# Patient Record
Sex: FEMALE | Race: BLACK OR AFRICAN AMERICAN | Employment: UNEMPLOYED | URBAN - METROPOLITAN AREA
[De-identification: names, ages, dates, MRNs, and addresses within clinical notes are randomized per-mention and may not be internally consistent; named-entity substitution may affect disease eponyms.]

---

## 2017-09-14 ENCOUNTER — APPOINTMENT (OUTPATIENT)
Dept: GENERAL RADIOLOGY | Age: 42
End: 2017-09-14
Attending: EMERGENCY MEDICINE
Payer: SELF-PAY

## 2017-09-14 ENCOUNTER — HOSPITAL ENCOUNTER (EMERGENCY)
Age: 42
Discharge: HOME | End: 2017-09-14
Attending: EMERGENCY MEDICINE
Payer: SELF-PAY

## 2017-09-14 VITALS
RESPIRATION RATE: 18 BRPM | TEMPERATURE: 98.3 F | OXYGEN SATURATION: 100 % | WEIGHT: 242.5 LBS | HEIGHT: 64 IN | HEART RATE: 69 BPM | DIASTOLIC BLOOD PRESSURE: 60 MMHG | SYSTOLIC BLOOD PRESSURE: 101 MMHG | BODY MASS INDEX: 41.4 KG/M2

## 2017-09-14 DIAGNOSIS — R07.9 ACUTE CHEST PAIN: Primary | ICD-10-CM

## 2017-09-14 LAB
ALBUMIN SERPL-MCNC: 2.9 G/DL (ref 3.5–5)
ALBUMIN/GLOB SERPL: 0.6 {RATIO} (ref 1.1–2.2)
ALP SERPL-CCNC: 66 U/L (ref 45–117)
ALT SERPL-CCNC: 35 U/L (ref 12–78)
ANION GAP SERPL CALC-SCNC: 7 MMOL/L (ref 5–15)
AST SERPL-CCNC: 30 U/L (ref 15–37)
BILIRUB SERPL-MCNC: 0.1 MG/DL (ref 0.2–1)
BUN SERPL-MCNC: 14 MG/DL (ref 6–20)
BUN/CREAT SERPL: 17 (ref 12–20)
CALCIUM SERPL-MCNC: 8.8 MG/DL (ref 8.5–10.1)
CHLORIDE SERPL-SCNC: 105 MMOL/L (ref 97–108)
CK MB CFR SERPL CALC: 0.4 % (ref 0–2.5)
CK MB SERPL-MCNC: 1 NG/ML (ref 5–25)
CK SERPL-CCNC: 249 U/L (ref 26–192)
CO2 SERPL-SCNC: 27 MMOL/L (ref 21–32)
CREAT SERPL-MCNC: 0.81 MG/DL (ref 0.55–1.02)
GLOBULIN SER CALC-MCNC: 4.6 G/DL (ref 2–4)
GLUCOSE SERPL-MCNC: 97 MG/DL (ref 65–100)
POTASSIUM SERPL-SCNC: 3.4 MMOL/L (ref 3.5–5.1)
PROT SERPL-MCNC: 7.5 G/DL (ref 6.4–8.2)
SODIUM SERPL-SCNC: 139 MMOL/L (ref 136–145)
TROPONIN I SERPL-MCNC: <0.04 NG/ML

## 2017-09-14 PROCEDURE — 71010 XR CHEST PORT: CPT

## 2017-09-14 PROCEDURE — 82550 ASSAY OF CK (CPK): CPT | Performed by: EMERGENCY MEDICINE

## 2017-09-14 PROCEDURE — 84484 ASSAY OF TROPONIN QUANT: CPT | Performed by: EMERGENCY MEDICINE

## 2017-09-14 PROCEDURE — 99285 EMERGENCY DEPT VISIT HI MDM: CPT

## 2017-09-14 PROCEDURE — 36415 COLL VENOUS BLD VENIPUNCTURE: CPT | Performed by: EMERGENCY MEDICINE

## 2017-09-14 PROCEDURE — 80053 COMPREHEN METABOLIC PANEL: CPT | Performed by: EMERGENCY MEDICINE

## 2017-09-14 PROCEDURE — 93005 ELECTROCARDIOGRAM TRACING: CPT

## 2017-09-14 RX ORDER — SODIUM CHLORIDE 0.9 % (FLUSH) 0.9 %
5-10 SYRINGE (ML) INJECTION EVERY 8 HOURS
Status: DISCONTINUED | OUTPATIENT
Start: 2017-09-14 | End: 2017-09-14 | Stop reason: HOSPADM

## 2017-09-14 RX ORDER — SODIUM CHLORIDE 0.9 % (FLUSH) 0.9 %
5-10 SYRINGE (ML) INJECTION AS NEEDED
Status: DISCONTINUED | OUTPATIENT
Start: 2017-09-14 | End: 2017-09-14 | Stop reason: HOSPADM

## 2017-09-14 NOTE — ED NOTES
Pt c/o non radiating mid chest pain started x 30 min ago,sts\" My new med give me chest pain. \"  Emergency Department Nursing Plan of Care       The Nursing Plan of Care is developed from the Nursing assessment and Emergency Department Attending provider initial evaluation. The plan of care may be reviewed in the ED Provider note.     The Plan of Care was developed with the following considerations:   Patient / Family readiness to learn indicated by:verbalized understanding  Persons(s) to be included in education: patient  Barriers to Learning/Limitations:No    Signed     Kylah Adams RN    9/14/2017   12:50 PM

## 2017-09-14 NOTE — ED PROVIDER NOTES
HPI Comments: Iggy Saleh is a 39 y.o. female with PMHx of dentalgia who presents via EMS to the ED c/o sharp non-radiating mid-sternal CP x today. Per EMS, pt has been taking Methadone for 2 days now. Pt specifically denies cough, constipation, fever, chills, nausea, vomiting, diarrhea, or SOB. Social hx: - EtOH use, - Illicit drug use    PCP: None    There are no other complaints, changes or physical findings at this time. The history is provided by the patient and the EMS personnel. No  was used. No past medical history on file. Past Surgical History:   Procedure Laterality Date    HX ORTHOPAEDIC      R knee         No family history on file. Social History     Social History    Marital status: SINGLE     Spouse name: N/A    Number of children: N/A    Years of education: N/A     Occupational History    Not on file. Social History Main Topics    Smoking status: Never Smoker    Smokeless tobacco: Not on file    Alcohol use No    Drug use: No    Sexual activity: Not on file     Other Topics Concern    Not on file     Social History Narrative    No narrative on file         ALLERGIES: Zofran [ondansetron hcl (pf)]; Amoxicillin; Bactrim [sulfamethoprim ds]; Darvocet a500 [propoxyphene n-acetaminophen]; Doxycycline; Penicillin v; and Ultram [tramadol]    Review of Systems   Constitutional: Negative for chills and fever. HENT: Negative for congestion, rhinorrhea, sneezing and sore throat. Eyes: Negative for redness and visual disturbance. Respiratory: Negative for cough and shortness of breath. Cardiovascular: Positive for chest pain. Negative for leg swelling. Gastrointestinal: Negative for abdominal pain, constipation, nausea and vomiting. Genitourinary: Negative for difficulty urinating and frequency. Musculoskeletal: Negative for back pain, myalgias and neck stiffness. Skin: Negative for rash.    Neurological: Negative for dizziness, syncope, weakness and headaches. Hematological: Negative for adenopathy. Vitals:    09/14/17 1242 09/14/17 1352   BP: 103/61 101/60   Pulse: 72 69   Resp: 18    Temp: 98.3 °F (36.8 °C)    SpO2: 100%    Weight: 110 kg (242 lb 8 oz)    Height: 5' 4\" (1.626 m)             Physical Exam   Constitutional: She is oriented to person, place, and time. She appears well-developed and well-nourished. Obese   HENT:   Head: Normocephalic and atraumatic. Mouth/Throat: Oropharynx is clear and moist.   Eyes: Conjunctivae and EOM are normal.   Neck: Normal range of motion and full passive range of motion without pain. Neck supple. Cardiovascular: Normal rate, regular rhythm, S1 normal, S2 normal, normal heart sounds, intact distal pulses and normal pulses. No murmur heard. Pulmonary/Chest: Effort normal and breath sounds normal. No respiratory distress. She has no wheezes. Abdominal: Soft. Normal appearance and bowel sounds are normal. She exhibits no distension. There is no tenderness. There is no rebound. Musculoskeletal: Normal range of motion. She exhibits no tenderness. Non-pitting edema. Neurological: She is alert and oriented to person, place, and time. She has normal strength. Lethargic/somnolent   Skin: Skin is warm, dry and intact. No rash noted. Psychiatric: She has a normal mood and affect. Her speech is normal and behavior is normal. Judgment and thought content normal.   Nursing note and vitals reviewed.        MDM  Number of Diagnoses or Management Options     Amount and/or Complexity of Data Reviewed  Clinical lab tests: ordered and reviewed  Tests in the radiology section of CPT®: reviewed and ordered  Obtain history from someone other than the patient: yes (EMS)  Review and summarize past medical records: yes  Independent visualization of images, tracings, or specimens: yes    Patient Progress  Patient progress: stable    ED Course       Procedures    Progress Note:  2:46 PM  Pt actively resistant to having blood drawn for ABG testing, putting personnel at risk with her behavior. Written by Nay Morrison ED Scribe, as dictated by Gonzales Wells MD.    Progress Note:  3:08 PM  Pt declines any further workup. She states that she wants to be discharged at this time, would like to go home and eat. Written by TEAGAN Knappibnikia, as dictated by Gonzales Wells MD.    Progress Note:  3:28 PM  Pt was ambulated in ED without difficulty. Written by Nay Morrison ED Scribe, as dictated by Gonzales Wells MD.    LABORATORY TESTS:  Recent Results (from the past 12 hour(s))   METABOLIC PANEL, COMPREHENSIVE    Collection Time: 09/14/17  2:30 PM   Result Value Ref Range    Sodium 139 136 - 145 mmol/L    Potassium 3.4 (L) 3.5 - 5.1 mmol/L    Chloride 105 97 - 108 mmol/L    CO2 27 21 - 32 mmol/L    Anion gap 7 5 - 15 mmol/L    Glucose 97 65 - 100 mg/dL    BUN 14 6 - 20 MG/DL    Creatinine 0.81 0.55 - 1.02 MG/DL    BUN/Creatinine ratio 17 12 - 20      GFR est AA >60 >60 ml/min/1.73m2    GFR est non-AA >60 >60 ml/min/1.73m2    Calcium 8.8 8.5 - 10.1 MG/DL    Bilirubin, total 0.1 (L) 0.2 - 1.0 MG/DL    ALT (SGPT) 35 12 - 78 U/L    AST (SGOT) 30 15 - 37 U/L    Alk. phosphatase 66 45 - 117 U/L    Protein, total 7.5 6.4 - 8.2 g/dL    Albumin 2.9 (L) 3.5 - 5.0 g/dL    Globulin 4.6 (H) 2.0 - 4.0 g/dL    A-G Ratio 0.6 (L) 1.1 - 2.2     CK W/ CKMB & INDEX    Collection Time: 09/14/17  2:30 PM   Result Value Ref Range     (H) 26 - 192 U/L    CK - MB 1.0 <3.6 NG/ML    CK-MB Index 0.4 0 - 2.5     TROPONIN I    Collection Time: 09/14/17  2:30 PM   Result Value Ref Range    Troponin-I, Qt. <0.04 <0.05 ng/mL       IMAGING RESULTS:  CXR Results  (Last 48 hours)               09/14/17 1345  XR CHEST PORT Final result    Impression:  IMPRESSION:        No acute process. Narrative:  EXAM:  XR CHEST PORT       INDICATION:  chest pain       COMPARISON:  None.        FINDINGS:       A portable AP radiograph of the chest was obtained at 13:40 hours. The patient   is on a cardiac monitor. The lungs are clear. The cardiac and mediastinal   contours and pulmonary vascularity are normal.  The bones and soft tissues are   unremarkable. MEDICATIONS GIVEN:  Medications   sodium chloride (NS) flush 5-10 mL (not administered)   sodium chloride (NS) flush 5-10 mL (not administered)       IMPRESSION:  1. Acute chest pain        PLAN:  1. Current Discharge Medication List        2. Follow-up Information     Follow up With Details Comments [id number]   [address]  [phone]        Return to ED if worse     DISCHARGE NOTE  3:37 PM  The patient has been re-evaluated and is ready for discharge. Reviewed available results with patient. Counseled pt on diagnosis and care plan. Pt has expressed understanding, and all questions have been answered. Pt agrees with plan and agrees to F/U as recommended, or return to the ED if their sxs worsen. Discharge instructions have been provided and explained to the pt, along with reasons to return to the ED. This note is prepared by Raji Ratliff, acting as Scribe for Shady Mcdowell MD.    Shady Mcdowell MD: The scribe's documentation has been prepared under my direction and personally reviewed by me in its entirety. I confirm that the note above accurately reflects all work, treatment, procedures, and medical decision making performed by me.

## 2017-09-14 NOTE — ED NOTES
Pt walk in the hallway with steady gait,O2 sat 94% and HR 58,pt sts\"feels better\" no s/s of distress while walking,provider made aware.

## 2017-09-14 NOTE — DISCHARGE INSTRUCTIONS
Chest Pain: Care Instructions  Your Care Instructions  There are many things that can cause chest pain. Some are not serious and will get better on their own in a few days. But some kinds of chest pain need more testing and treatment. Your doctor may have recommended a follow-up visit in the next 8 to 12 hours. If you are not getting better, you may need more tests or treatment. Even though your doctor has released you, you still need to watch for any problems. The doctor carefully checked you, but sometimes problems can develop later. If you have new symptoms or if your symptoms do not get better, get medical care right away. If you have worse or different chest pain or pressure that lasts more than 5 minutes or you passed out (lost consciousness), call 911 or seek other emergency help right away. A medical visit is only one step in your treatment. Even if you feel better, you still need to do what your doctor recommends, such as going to all suggested follow-up appointments and taking medicines exactly as directed. This will help you recover and help prevent future problems. How can you care for yourself at home? · Rest until you feel better. · Take your medicine exactly as prescribed. Call your doctor if you think you are having a problem with your medicine. · Do not drive after taking a prescription pain medicine. When should you call for help? Call 911 if:  · You passed out (lost consciousness). · You have severe difficulty breathing. · You have symptoms of a heart attack. These may include:  ¨ Chest pain or pressure, or a strange feeling in your chest.  ¨ Sweating. ¨ Shortness of breath. ¨ Nausea or vomiting. ¨ Pain, pressure, or a strange feeling in your back, neck, jaw, or upper belly or in one or both shoulders or arms. ¨ Lightheadedness or sudden weakness. ¨ A fast or irregular heartbeat.   After you call 911, the  may tell you to chew 1 adult-strength or 2 to 4 low-dose aspirin. Wait for an ambulance. Do not try to drive yourself. Call your doctor today if:  · You have any trouble breathing. · Your chest pain gets worse. · You are dizzy or lightheaded, or you feel like you may faint. · You are not getting better as expected. · You are having new or different chest pain. Where can you learn more? Go to http://hugo-denise.info/. Enter A120 in the search box to learn more about \"Chest Pain: Care Instructions. \"  Current as of: March 20, 2017  Content Version: 11.3  © 3732-8681 [employer]. Care instructions adapted under license by [employer] (which disclaims liability or warranty for this information). If you have questions about a medical condition or this instruction, always ask your healthcare professional. [address] any warranty or liability for your use of this information.

## 2017-09-14 NOTE — ED TRIAGE NOTES
Pt arrived via EMS with c/onon radiating mid chest pain started x 30 min ago. As per EMS report pt started methadone and today her day 2 of this med. Denies n/v/d,fever,chills,sob.

## 2017-09-14 NOTE — ED NOTES
Pt sts\"arterial stick hurts so bad,you can draw lab with other way. \"so attempted to draw blood with straight stick but as soon as she saw blood going in the tube she pulled her arm and said this is enough you don't need to fill whole tube and want to take out needle, she stuck multiple times but she did same each time,so unable to send blood in the lab as its not enough to run test,provider Dr Fuad Lei made aware.

## 2017-09-19 LAB
ATRIAL RATE: 66 BPM
CALCULATED P AXIS, ECG09: 41 DEGREES
CALCULATED R AXIS, ECG10: 35 DEGREES
CALCULATED T AXIS, ECG11: 43 DEGREES
DIAGNOSIS, 93000: NORMAL
P-R INTERVAL, ECG05: 192 MS
Q-T INTERVAL, ECG07: 406 MS
QRS DURATION, ECG06: 78 MS
QTC CALCULATION (BEZET), ECG08: 425 MS
VENTRICULAR RATE, ECG03: 66 BPM

## 2021-07-07 ENCOUNTER — HOSPITAL ENCOUNTER (EMERGENCY)
Age: 46
Discharge: HOME | End: 2021-07-08
Attending: EMERGENCY MEDICINE
Payer: MEDICAID

## 2021-07-07 DIAGNOSIS — F19.10 SUBSTANCE ABUSE (HCC): Primary | ICD-10-CM

## 2021-07-07 DIAGNOSIS — T40.601A OPIATE OVERDOSE, ACCIDENTAL OR UNINTENTIONAL, INITIAL ENCOUNTER (HCC): ICD-10-CM

## 2021-07-07 LAB
ALBUMIN SERPL-MCNC: 3.5 G/DL (ref 3.5–5)
ALBUMIN/GLOB SERPL: 0.6 {RATIO} (ref 1.1–2.2)
ALP SERPL-CCNC: 75 U/L (ref 45–117)
ALT SERPL-CCNC: 37 U/L (ref 12–78)
AMPHET UR QL SCN: NEGATIVE
ANION GAP SERPL CALC-SCNC: 6 MMOL/L (ref 5–15)
APPEARANCE UR: ABNORMAL
AST SERPL-CCNC: 45 U/L (ref 15–37)
BACTERIA URNS QL MICRO: ABNORMAL /HPF
BARBITURATES UR QL SCN: NEGATIVE
BASOPHILS # BLD: 0 K/UL (ref 0–0.1)
BASOPHILS NFR BLD: 0 % (ref 0–1)
BENZODIAZ UR QL: NEGATIVE
BILIRUB SERPL-MCNC: 0.3 MG/DL (ref 0.2–1)
BILIRUB UR QL CFM: NEGATIVE
BUN SERPL-MCNC: 9 MG/DL (ref 6–20)
BUN/CREAT SERPL: 7 (ref 12–20)
CALCIUM SERPL-MCNC: 9.4 MG/DL (ref 8.5–10.1)
CANNABINOIDS UR QL SCN: NEGATIVE
CHLORIDE SERPL-SCNC: 106 MMOL/L (ref 97–108)
CO2 SERPL-SCNC: 27 MMOL/L (ref 21–32)
COCAINE UR QL SCN: POSITIVE
COLOR UR: ABNORMAL
CREAT SERPL-MCNC: 1.21 MG/DL (ref 0.55–1.02)
DIFFERENTIAL METHOD BLD: ABNORMAL
DRUG SCRN COMMENT,DRGCM: ABNORMAL
EOSINOPHIL # BLD: 0.1 K/UL (ref 0–0.4)
EOSINOPHIL NFR BLD: 1 % (ref 0–7)
EPITH CASTS URNS QL MICRO: ABNORMAL /LPF
ERYTHROCYTE [DISTWIDTH] IN BLOOD BY AUTOMATED COUNT: 13.9 % (ref 11.5–14.5)
ETHANOL SERPL-MCNC: <10 MG/DL
GLOBULIN SER CALC-MCNC: 6.3 G/DL (ref 2–4)
GLUCOSE SERPL-MCNC: 92 MG/DL (ref 65–100)
GLUCOSE UR STRIP.AUTO-MCNC: NEGATIVE MG/DL
HCT VFR BLD AUTO: 37.8 % (ref 35–47)
HGB BLD-MCNC: 11.7 G/DL (ref 11.5–16)
HGB UR QL STRIP: NEGATIVE
IMM GRANULOCYTES # BLD AUTO: 0 K/UL (ref 0–0.04)
IMM GRANULOCYTES NFR BLD AUTO: 0 % (ref 0–0.5)
KETONES UR QL STRIP.AUTO: NEGATIVE MG/DL
LEUKOCYTE ESTERASE UR QL STRIP.AUTO: ABNORMAL
LYMPHOCYTES # BLD: 1.3 K/UL (ref 0.8–3.5)
LYMPHOCYTES NFR BLD: 14 % (ref 12–49)
MCH RBC QN AUTO: 26.8 PG (ref 26–34)
MCHC RBC AUTO-ENTMCNC: 31 G/DL (ref 30–36.5)
MCV RBC AUTO: 86.5 FL (ref 80–99)
METHADONE UR QL: NEGATIVE
MONOCYTES # BLD: 0.6 K/UL (ref 0–1)
MONOCYTES NFR BLD: 6 % (ref 5–13)
NEUTS SEG # BLD: 7.6 K/UL (ref 1.8–8)
NEUTS SEG NFR BLD: 79 % (ref 32–75)
NITRITE UR QL STRIP.AUTO: NEGATIVE
NRBC # BLD: 0 K/UL (ref 0–0.01)
NRBC BLD-RTO: 0 PER 100 WBC
OPIATES UR QL: POSITIVE
PCP UR QL: NEGATIVE
PH UR STRIP: 5.5 [PH] (ref 5–8)
PLATELET # BLD AUTO: 349 K/UL (ref 150–400)
PMV BLD AUTO: 10.1 FL (ref 8.9–12.9)
POTASSIUM SERPL-SCNC: 3.6 MMOL/L (ref 3.5–5.1)
PROT SERPL-MCNC: 9.8 G/DL (ref 6.4–8.2)
PROT UR STRIP-MCNC: 30 MG/DL
RBC # BLD AUTO: 4.37 M/UL (ref 3.8–5.2)
RBC #/AREA URNS HPF: ABNORMAL /HPF (ref 0–5)
SODIUM SERPL-SCNC: 139 MMOL/L (ref 136–145)
SP GR UR REFRACTOMETRY: 1.02 (ref 1–1.03)
UA: UC IF INDICATED,UAUC: ABNORMAL
UROBILINOGEN UR QL STRIP.AUTO: 1 EU/DL (ref 0.2–1)
WBC # BLD AUTO: 9.6 K/UL (ref 3.6–11)
WBC URNS QL MICRO: ABNORMAL /HPF (ref 0–4)

## 2021-07-07 PROCEDURE — 80053 COMPREHEN METABOLIC PANEL: CPT

## 2021-07-07 PROCEDURE — 99285 EMERGENCY DEPT VISIT HI MDM: CPT

## 2021-07-07 PROCEDURE — 85025 COMPLETE CBC W/AUTO DIFF WBC: CPT

## 2021-07-07 PROCEDURE — 80307 DRUG TEST PRSMV CHEM ANLYZR: CPT

## 2021-07-07 PROCEDURE — 81001 URINALYSIS AUTO W/SCOPE: CPT

## 2021-07-07 PROCEDURE — 82077 ASSAY SPEC XCP UR&BREATH IA: CPT

## 2021-07-07 PROCEDURE — 93005 ELECTROCARDIOGRAM TRACING: CPT

## 2021-07-07 PROCEDURE — 36415 COLL VENOUS BLD VENIPUNCTURE: CPT

## 2021-07-08 VITALS
BODY MASS INDEX: 40.2 KG/M2 | TEMPERATURE: 98.1 F | OXYGEN SATURATION: 97 % | SYSTOLIC BLOOD PRESSURE: 142 MMHG | RESPIRATION RATE: 21 BRPM | WEIGHT: 235.45 LBS | HEIGHT: 64 IN | HEART RATE: 81 BPM | DIASTOLIC BLOOD PRESSURE: 92 MMHG

## 2021-07-08 LAB
ATRIAL RATE: 97 BPM
CALCULATED P AXIS, ECG09: 47 DEGREES
CALCULATED R AXIS, ECG10: 41 DEGREES
CALCULATED T AXIS, ECG11: 44 DEGREES
DIAGNOSIS, 93000: NORMAL
P-R INTERVAL, ECG05: 176 MS
Q-T INTERVAL, ECG07: 340 MS
QRS DURATION, ECG06: 72 MS
QTC CALCULATION (BEZET), ECG08: 431 MS
VENTRICULAR RATE, ECG03: 97 BPM

## 2021-07-08 NOTE — ED NOTES
Pt came to the ER via EMS. Pt's family called [phone] for patient  being not herself, and drowsy. As per EMS Pt has hx of drug abuse and her pupils were constricted. Pt denies taking any drugs. Pt is awake and alert. Pt stated that she will wait at the waiting area till her ride comes to get her. I have reviewed discharge instructions with the patient. The patient verbalized understanding.

## 2021-07-08 NOTE — ED PROVIDER NOTES
EMERGENCY DEPARTMENT HISTORY AND PHYSICAL EXAM    Please note that this dictation was completed with [employer], the computer voice recognition software. Quite often unanticipated grammatical, syntax, homophones, and other interpretive errors are inadvertently transcribed by the computer software. Please disregard these errors. Please excuse any errors that have escaped final proofreading. Date: 7/7/2021  Patient Name: Eduardo Quijano  Patient Age and Sex: 39 y.o. female    History of Presenting Illness     Chief Complaint   Patient presents with    Drug Overdose     Pt came via ems for possible overdose. Pt has hx of drug use and family called for Pt acting sedated and having episodes of unresponsiveness. History Provided By: Patient    HPI: Eduardo Quijano, is a 39 y.o. female whose medical history is noted below and includes polysubstance abuse presents to the ED via ambulance after the family found her at home somnolent. They called 911, implants at the scene the patient appeared to be waking up, was very groggy but spontaneously breathing. They noted pinpoint pupils. Narcan or other medications were administered. Glucose was normal.  Patient was transported to us for further evaluation. Pt denies any other alleviating or exacerbating factors. No other associated signs or symptoms. There are no other complaints, changes or physical findings at this time. PCP: None    Past History   All documented elements of the [facility] reviewed and verified by me. -Ruthann Cogan, MD    Past Medical History:  No past medical history on file. Past Surgical History:  Past Surgical History:   Procedure Laterality Date    HX ORTHOPAEDIC      R knee       Family History:  No family history on file. Social History:  Social History     Tobacco Use    Smoking status: Never Smoker   Substance Use Topics    Alcohol use: No    Drug use: No       Allergies:   Allergies   Allergen Reactions    Zofran [Ondansetron Hcl (Pf)] Anaphylaxis    Amoxicillin Unknown (comments)    Bactrim [Sulfamethoprim Ds] Unknown (comments)    Darvocet A500 [Propoxyphene N-Acetaminophen] Rash    Doxycycline Unknown (comments)    Penicillin V Unknown (comments)    Ultram [Tramadol] Unknown (comments)       Review of Systems   All other systems reviewed and negative    Review of Systems   Constitutional: Negative for chills and fever. HENT: Negative. Eyes: Negative. Negative for photophobia and visual disturbance. Respiratory: Negative for cough and shortness of breath. Cardiovascular: Negative for chest pain and palpitations. Gastrointestinal: Negative for abdominal pain, diarrhea, nausea and vomiting. Endocrine: Negative. Genitourinary: Negative for dysuria and flank pain. Musculoskeletal: Negative for back pain and myalgias. Skin: Negative. Negative for rash. Neurological: Negative for tremors, speech difficulty, weakness, light-headedness and headaches. Hematological: Negative. Psychiatric/Behavioral: Negative for hallucinations and suicidal ideas. All other systems reviewed and are negative. Physical Exam   Reviewed patients vital signs and nursing note    Physical Exam  Vitals and nursing note reviewed. Constitutional:       Appearance: Normal appearance. HENT:      Head: Atraumatic. Mouth/Throat:      Mouth: Mucous membranes are moist.   Eyes:      General: No scleral icterus. Extraocular Movements: Extraocular movements intact. Conjunctiva/sclera: Conjunctivae normal.      Pupils: Pupils are equal, round, and reactive to light. Cardiovascular:      Rate and Rhythm: Normal rate and regular rhythm. Pulses: Normal pulses. Heart sounds: Normal heart sounds. Pulmonary:      Effort: Pulmonary effort is normal.      Breath sounds: Normal breath sounds. Abdominal:      Palpations: Abdomen is soft. Tenderness: There is no abdominal tenderness.    Musculoskeletal: General: Normal range of motion. Cervical back: Normal range of motion and neck supple. Skin:     General: Skin is warm and dry. Capillary Refill: Capillary refill takes less than 2 seconds. Neurological:      General: No focal deficit present. Mental Status: She is alert and oriented to person, place, and time. Psychiatric:         Mood and Affect: Mood normal.         Behavior: Behavior normal.         Diagnostic Study Results     Labs - I have personally reviewed and interpreted all laboratory results. Quyen Medellin MD, MSc  Recent Results (from the past 24 hour(s))   CBC WITH AUTOMATED DIFF    Collection Time: 07/07/21 10:31 PM   Result Value Ref Range    WBC 9.6 3.6 - 11.0 K/uL    RBC 4.37 3.80 - 5.20 M/uL    HGB 11.7 11.5 - 16.0 g/dL    HCT 37.8 35.0 - 47.0 %    MCV 86.5 80.0 - 99.0 FL    MCH 26.8 26.0 - 34.0 PG    MCHC 31.0 30.0 - 36.5 g/dL    RDW 13.9 11.5 - 14.5 %    PLATELET 531 351 - 722 K/uL    MPV 10.1 8.9 - 12.9 FL    NRBC 0.0 0  WBC    ABSOLUTE NRBC 0.00 0.00 - 0.01 K/uL    NEUTROPHILS 79 (H) 32 - 75 %    LYMPHOCYTES 14 12 - 49 %    MONOCYTES 6 5 - 13 %    EOSINOPHILS 1 0 - 7 %    BASOPHILS 0 0 - 1 %    IMMATURE GRANULOCYTES 0 0.0 - 0.5 %    ABS. NEUTROPHILS 7.6 1.8 - 8.0 K/UL    ABS. LYMPHOCYTES 1.3 0.8 - 3.5 K/UL    ABS. MONOCYTES 0.6 0.0 - 1.0 K/UL    ABS. EOSINOPHILS 0.1 0.0 - 0.4 K/UL    ABS. BASOPHILS 0.0 0.0 - 0.1 K/UL    ABS. IMM.  GRANS. 0.0 0.00 - 0.04 K/UL    DF AUTOMATED     METABOLIC PANEL, COMPREHENSIVE    Collection Time: 07/07/21 10:31 PM   Result Value Ref Range    Sodium 139 136 - 145 mmol/L    Potassium 3.6 3.5 - 5.1 mmol/L    Chloride 106 97 - 108 mmol/L    CO2 27 21 - 32 mmol/L    Anion gap 6 5 - 15 mmol/L    Glucose 92 65 - 100 mg/dL    BUN 9 6 - 20 MG/DL    Creatinine 1.21 (H) 0.55 - 1.02 MG/DL    BUN/Creatinine ratio 7 (L) 12 - 20      GFR est AA 58 (L) >60 ml/min/1.73m2    GFR est non-AA 48 (L) >60 ml/min/1.73m2    Calcium 9.4 8.5 - 10.1 MG/DL Bilirubin, total 0.3 0.2 - 1.0 MG/DL    ALT (SGPT) 37 12 - 78 U/L    AST (SGOT) 45 (H) 15 - 37 U/L    Alk. phosphatase 75 45 - 117 U/L    Protein, total 9.8 (H) 6.4 - 8.2 g/dL    Albumin 3.5 3.5 - 5.0 g/dL    Globulin 6.3 (H) 2.0 - 4.0 g/dL    A-G Ratio 0.6 (L) 1.1 - 2.2     ETHYL ALCOHOL    Collection Time: 07/07/21 10:31 PM   Result Value Ref Range    ALCOHOL(ETHYL),SERUM <10 <10 MG/DL   URINALYSIS W/ REFLEX CULTURE    Collection Time: 07/07/21 10:31 PM    Specimen: Urine    Urine specimen   Result Value Ref Range    Color YELLOW/STRAW      Appearance CLOUDY (A) CLEAR      Specific gravity 1.024 1.003 - 1.030      pH (UA) 5.5 5.0 - 8.0      Protein 30 (A) NEG mg/dL    Glucose Negative NEG mg/dL    Ketone Negative NEG mg/dL    Blood Negative NEG      Urobilinogen 1.0 0.2 - 1.0 EU/dL    Nitrites Negative NEG      Leukocyte Esterase TRACE (A) NEG      WBC 5-10 0 - 4 /hpf    RBC 0-5 0 - 5 /hpf    Epithelial cells MANY (A) FEW /lpf    Bacteria 1+ (A) NEG /hpf    UA:UC IF INDICATED CULTURE NOT INDICATED BY UA RESULT CNI     DRUG SCREEN, URINE    Collection Time: 07/07/21 10:31 PM   Result Value Ref Range    AMPHETAMINES Negative NEG      BARBITURATES Negative NEG      BENZODIAZEPINES Negative NEG      COCAINE Positive (A) NEG      METHADONE Negative NEG      OPIATES Positive (A) NEG      PCP(PHENCYCLIDINE) Negative NEG      THC (TH-CANNABINOL) Negative NEG      Drug screen comment (NOTE)    BILIRUBIN, CONFIRM    Collection Time: 07/07/21 10:31 PM   Result Value Ref Range    Bilirubin UA, confirm Negative NEG         Radiologic Studies - I have personally reviewed and interpreted all imaging studies and agree with radiology interpretation and report. - Mahamed Trejo MD, MSc  No orders to display         Medical Decision Making   I am the first provider for this patient.     Records Reviewed: I reviewed our electronic medical record system for any past medical records that were available that may contribute to the patient's current condition, including their PMH, surgical history, social and family history. Reviewed the nursing notes and vital signs from today's visit. Nursing notes will be reviewed as they become available in realtime while the pt has been in the ED. In addition, I read most recent discharge summaries, if available and reviewed prior ECGs or imaging studies for comparison purposes. Jesse Shankar MD Msc    Vital Signs-Reviewed the patient's vital signs. Patient Vitals for the past 24 hrs:   Temp Pulse Resp BP SpO2   07/07/21 2358 -- 81 -- -- 97 %   07/07/21 2357 -- 84 21 -- 96 %   07/07/21 2356 -- 80 13 -- 100 %   07/07/21 2038 98.1 °F (36.7 °C) (!) 122 12 136/73 98 %         Provider Notes (Medical Decision Making):   Patient is a 49-year-old female with history of polysubstance abuse who presents to the emergency department after being found in the field with clinical signs of an opiate overdose. She was spontaneously breathing then and required no Narcan administration. By time she got to the emergency department, she was awake, alert and oriented. Had no complaints. Confirms opiate and cocaine use. No alcohol use. No other ingestions. This was unintentional overdose, she denies SI/HI. Obtain basic labs and UA to ensure that there is no underlying electrolyte or metabolic abnormalities. These are all normal based on mental rotation of the results. Patient is continued to do well, awake and alert, continues to have no complaints. Steady gait, no neurologic deficits. Okay to discharge. ED Course:   Initial assessment performed. The patients presenting problems have been discussed, and they are in agreement with the care plan formulated and outlined with them. I have encouraged them to ask questions as they arise throughout their visit.          Drug Abuse Cessation: Assessment and Intervention    Discussed the risks of drug abuse and the long term sequelae of cannabis use with the patient such as increased risk of depression, respiratory failure, heart attack, stroke, and death. Patient was offered follow-up resources on local rehabilitation facilities. Patient declined the resources. The patient verbalized their understanding. Counseled patient for approximately 15 minutes (between 15-30 minutes). Progress note:  Patient has been reassessed and reports feeling considerably better, has normal vital signs and feels comfortable going home. I think this is reasonable as no findings today suggest a life-threatening condition. DISPOSITION: DISCHARGE  The patient's results have been reviewed with patient and available family and/or caregiver. They verbally convey their understanding and agreement of the patient's signs, symptoms, diagnosis, treatment and prognosis and additionally agree to follow up as recommended in the discharge instructions or to return to the Emergency Department should the patient's condition change prior to their follow-up appointment. The patient and available family and/or caregiver verbally agree with the care plan and all of their questions have been answered. The discharge instructions have also been provided to the them with educational information regarding the patient's diagnosis as well a list of reasons why the patient would want to return to the ER prior to their follow-up appointment should any concerns arise, the patient's condition change or symptoms worsen. Harry Suero MD, Msc    PLAN:  Current Discharge Medication List      1.   2.     Follow-up Information     Follow up With Specialties Details Why Contact Info    [facility] EMERGENCY DEPT Emergency Medicine  As needed [address] [place] [address]  [address] [facility]  [phone]        3. Return to ED if worse       Oniel VENCES MD, am the attending of record for this patient encounter. Diagnosis     Clinical Impression:   1.  Substance abuse ([facility] [address]    2. Opiate overdose, accidental or unintentional, initial encounter [facility])        Attestation:  I personally performed the services described in this documentation on this date 7/7/2021 for patient Margi Lim.   Sophia Luna MD

## 2022-08-13 ENCOUNTER — APPOINTMENT (OUTPATIENT)
Dept: VASCULAR SURGERY | Age: 47
End: 2022-08-13
Attending: EMERGENCY MEDICINE
Payer: MEDICAID

## 2022-08-13 ENCOUNTER — HOSPITAL ENCOUNTER (EMERGENCY)
Age: 47
Discharge: ANOTHER HOSPITAL | End: 2022-08-14
Attending: EMERGENCY MEDICINE
Payer: MEDICAID

## 2022-08-13 ENCOUNTER — APPOINTMENT (OUTPATIENT)
Dept: CT IMAGING | Age: 47
End: 2022-08-13
Attending: EMERGENCY MEDICINE
Payer: MEDICAID

## 2022-08-13 DIAGNOSIS — L03.113 CELLULITIS OF RIGHT UPPER EXTREMITY: Primary | ICD-10-CM

## 2022-08-13 DIAGNOSIS — M00.9 PYOGENIC ARTHRITIS OF RIGHT KNEE JOINT, DUE TO UNSPECIFIED ORGANISM (HCC): ICD-10-CM

## 2022-08-13 LAB
ALBUMIN SERPL-MCNC: 1.5 G/DL (ref 3.5–5)
ALBUMIN/GLOB SERPL: 0.2 {RATIO} (ref 1.1–2.2)
ALP SERPL-CCNC: 80 U/L (ref 45–117)
ALT SERPL-CCNC: 9 U/L (ref 12–78)
ANION GAP SERPL CALC-SCNC: 7 MMOL/L (ref 5–15)
AST SERPL-CCNC: 27 U/L (ref 15–37)
BASOPHILS # BLD: 0 K/UL (ref 0–0.1)
BASOPHILS NFR BLD: 0 % (ref 0–1)
BILIRUB SERPL-MCNC: 0.3 MG/DL (ref 0.2–1)
BUN SERPL-MCNC: 8 MG/DL (ref 6–20)
BUN/CREAT SERPL: 10 (ref 12–20)
CALCIUM SERPL-MCNC: 9.2 MG/DL (ref 8.5–10.1)
CHLORIDE SERPL-SCNC: 99 MMOL/L (ref 97–108)
CO2 SERPL-SCNC: 31 MMOL/L (ref 21–32)
CREAT SERPL-MCNC: 0.79 MG/DL (ref 0.55–1.02)
DIFFERENTIAL METHOD BLD: ABNORMAL
EOSINOPHIL # BLD: 0.3 K/UL (ref 0–0.4)
EOSINOPHIL NFR BLD: 3 % (ref 0–7)
ERYTHROCYTE [DISTWIDTH] IN BLOOD BY AUTOMATED COUNT: 17.9 % (ref 11.5–14.5)
GLOBULIN SER CALC-MCNC: 8 G/DL (ref 2–4)
GLUCOSE SERPL-MCNC: 83 MG/DL (ref 65–100)
HCT VFR BLD AUTO: 28.2 % (ref 35–47)
HGB BLD-MCNC: 8.5 G/DL (ref 11.5–16)
IMM GRANULOCYTES # BLD AUTO: 0 K/UL (ref 0–0.04)
IMM GRANULOCYTES NFR BLD AUTO: 0 % (ref 0–0.5)
LYMPHOCYTES # BLD: 2.5 K/UL (ref 0.8–3.5)
LYMPHOCYTES NFR BLD: 24 % (ref 12–49)
MCH RBC QN AUTO: 21.7 PG (ref 26–34)
MCHC RBC AUTO-ENTMCNC: 30.1 G/DL (ref 30–36.5)
MCV RBC AUTO: 72.1 FL (ref 80–99)
MONOCYTES # BLD: 0.9 K/UL (ref 0–1)
MONOCYTES NFR BLD: 8 % (ref 5–13)
NEUTS SEG # BLD: 6.9 K/UL (ref 1.8–8)
NEUTS SEG NFR BLD: 65 % (ref 32–75)
NRBC # BLD: 0 K/UL (ref 0–0.01)
NRBC BLD-RTO: 0 PER 100 WBC
PLATELET # BLD AUTO: 516 K/UL (ref 150–400)
PMV BLD AUTO: 8.9 FL (ref 8.9–12.9)
POTASSIUM SERPL-SCNC: 2.9 MMOL/L (ref 3.5–5.1)
PROT SERPL-MCNC: 9.5 G/DL (ref 6.4–8.2)
RBC # BLD AUTO: 3.91 M/UL (ref 3.8–5.2)
SODIUM SERPL-SCNC: 137 MMOL/L (ref 136–145)
WBC # BLD AUTO: 10.6 K/UL (ref 3.6–11)

## 2022-08-13 PROCEDURE — 99285 EMERGENCY DEPT VISIT HI MDM: CPT

## 2022-08-13 PROCEDURE — 93005 ELECTROCARDIOGRAM TRACING: CPT

## 2022-08-13 PROCEDURE — 80053 COMPREHEN METABOLIC PANEL: CPT

## 2022-08-13 PROCEDURE — 85025 COMPLETE CBC W/AUTO DIFF WBC: CPT

## 2022-08-13 PROCEDURE — 36415 COLL VENOUS BLD VENIPUNCTURE: CPT

## 2022-08-13 PROCEDURE — 93971 EXTREMITY STUDY: CPT

## 2022-08-13 PROCEDURE — 87040 BLOOD CULTURE FOR BACTERIA: CPT

## 2022-08-13 PROCEDURE — 74011250636 HC RX REV CODE- 250/636: Performed by: EMERGENCY MEDICINE

## 2022-08-13 PROCEDURE — 96367 TX/PROPH/DG ADDL SEQ IV INF: CPT

## 2022-08-13 PROCEDURE — 73700 CT LOWER EXTREMITY W/O DYE: CPT

## 2022-08-13 PROCEDURE — 96365 THER/PROPH/DIAG IV INF INIT: CPT

## 2022-08-13 PROCEDURE — 74011250637 HC RX REV CODE- 250/637: Performed by: EMERGENCY MEDICINE

## 2022-08-13 PROCEDURE — 96375 TX/PRO/DX INJ NEW DRUG ADDON: CPT

## 2022-08-13 RX ORDER — POTASSIUM CHLORIDE 750 MG/1
40 TABLET, FILM COATED, EXTENDED RELEASE ORAL
Status: COMPLETED | OUTPATIENT
Start: 2022-08-13 | End: 2022-08-13

## 2022-08-13 RX ORDER — SODIUM CHLORIDE 0.9 % (FLUSH) 0.9 %
5-10 SYRINGE (ML) INJECTION AS NEEDED
Status: DISCONTINUED | OUTPATIENT
Start: 2022-08-13 | End: 2022-08-14 | Stop reason: HOSPADM

## 2022-08-13 RX ORDER — CIPROFLOXACIN 2 MG/ML
400 INJECTION, SOLUTION INTRAVENOUS
Status: DISCONTINUED | OUTPATIENT
Start: 2022-08-13 | End: 2022-08-13

## 2022-08-13 RX ORDER — MORPHINE SULFATE 4 MG/ML
4 INJECTION INTRAVENOUS
Status: COMPLETED | OUTPATIENT
Start: 2022-08-13 | End: 2022-08-13

## 2022-08-13 RX ORDER — KETOROLAC TROMETHAMINE 30 MG/ML
15 INJECTION, SOLUTION INTRAMUSCULAR; INTRAVENOUS
Status: COMPLETED | OUTPATIENT
Start: 2022-08-13 | End: 2022-08-13

## 2022-08-13 RX ORDER — CLINDAMYCIN HYDROCHLORIDE 300 MG/1
300 CAPSULE ORAL 4 TIMES DAILY
Qty: 28 CAPSULE | Refills: 0 | Status: SHIPPED | OUTPATIENT
Start: 2022-08-13 | End: 2022-08-19

## 2022-08-13 RX ORDER — LEVOFLOXACIN 5 MG/ML
750 INJECTION, SOLUTION INTRAVENOUS ONCE
Status: COMPLETED | OUTPATIENT
Start: 2022-08-13 | End: 2022-08-13

## 2022-08-13 RX ORDER — DIPHENHYDRAMINE HYDROCHLORIDE 50 MG/ML
50 INJECTION, SOLUTION INTRAMUSCULAR; INTRAVENOUS
Status: COMPLETED | OUTPATIENT
Start: 2022-08-13 | End: 2022-08-13

## 2022-08-13 RX ADMIN — MORPHINE SULFATE 4 MG: 4 INJECTION INTRAVENOUS at 16:54

## 2022-08-13 RX ADMIN — DIPHENHYDRAMINE HYDROCHLORIDE 50 MG: 50 INJECTION, SOLUTION INTRAMUSCULAR; INTRAVENOUS at 22:11

## 2022-08-13 RX ADMIN — VANCOMYCIN HYDROCHLORIDE 750 MG: 750 INJECTION, POWDER, LYOPHILIZED, FOR SOLUTION INTRAVENOUS at 20:40

## 2022-08-13 RX ADMIN — LEVOFLOXACIN 750 MG: 5 INJECTION, SOLUTION INTRAVENOUS at 22:11

## 2022-08-13 RX ADMIN — POTASSIUM CHLORIDE 40 MEQ: 750 TABLET, EXTENDED RELEASE ORAL at 18:31

## 2022-08-13 RX ADMIN — SODIUM CHLORIDE 1000 ML: 9 INJECTION, SOLUTION INTRAVENOUS at 16:54

## 2022-08-13 RX ADMIN — KETOROLAC TROMETHAMINE 15 MG: 30 INJECTION, SOLUTION INTRAMUSCULAR; INTRAVENOUS at 16:54

## 2022-08-13 NOTE — ED NOTES
This nurse was unable to establish IV access. Pt states that she usually has to get an IV in her neck.

## 2022-08-13 NOTE — ED PROVIDER NOTES
EMERGENCY DEPARTMENT HISTORY AND PHYSICAL EXAM      Date: 8/13/2022  Patient Name: Nicole Recinos  Patient Age and Sex: 55 y.o. female     History of Presenting Illness     Chief Complaint   Patient presents with    Leg Swelling     History Provided By: Patient    HPI: Nicole Recinos is a 66-year-old history of IV drug use presenting with leg swelling. She reports approximately 1 month of right sided leg swelling. She reports she believe this is related to a fall during which she twisted her right knee. She has a history of operative repair of an orthopedic injury to her right knee years ago. Patient also states she scratched her right forearm during this fall. She has had a chronic wound to that area since then. She reports some purulent discharge in the wound for the past 2 weeks approximately. No fevers no chills no nausea or vomiting. Significant pain, difficulty ambulating, uses a walker at home secondary to the pain in her right leg. Patient is predominantly concerned about her right leg. Initially reported history of cancer however patient denies this    There are no other complaints, changes, or physical findings at this time. PCP: None    No current facility-administered medications on file prior to encounter. Current Outpatient Medications on File Prior to Encounter   Medication Sig Dispense Refill    HYDROcodone-acetaminophen (NORCO) 5-325 mg per tablet Take 1 tablet by mouth every eight (8) hours as needed for Pain for up to 8 doses. 8 tablet 0    promethazine (PHENERGAN) 25 mg suppository Insert 1 suppository into rectum every six (6) hours as needed for Nausea for up to 7 doses. 7 suppository 0       Past History     Past Medical History:  No past medical history on file. Past Surgical History:  Past Surgical History:   Procedure Laterality Date    HX ORTHOPAEDIC      R knee       Family History:  No family history on file.     Social History:  Social History     Tobacco Use Smoking status: Never   Substance Use Topics    Alcohol use: No    Drug use: No       Allergies: Allergies   Allergen Reactions    Zofran [Ondansetron Hcl (Pf)] Anaphylaxis    Amoxicillin Unknown (comments)    Bactrim [Sulfamethoprim Ds] Unknown (comments)    Darvocet A500 [Propoxyphene N-Acetaminophen] Rash    Doxycycline Unknown (comments)    Penicillin V Unknown (comments)    Ultram [Tramadol] Unknown (comments)       Review of Systems   Review of Systems   Constitutional:  Negative for chills and fever. HENT:  Negative for congestion and rhinorrhea. Respiratory:  Negative for shortness of breath. Cardiovascular:  Positive for leg swelling. Negative for chest pain. Gastrointestinal:  Negative for abdominal pain, nausea and vomiting. Genitourinary:  Negative for dysuria and frequency. Musculoskeletal:  Negative for myalgias. Skin:  Positive for wound. All other systems reviewed and are negative. Physical Exam   Physical Exam  Vitals and nursing note reviewed. Constitutional:       General: She is not in acute distress. Appearance: Normal appearance. She is not ill-appearing. HENT:      Head: Normocephalic. Mouth/Throat:      Mouth: Mucous membranes are moist.   Eyes:      Conjunctiva/sclera: Conjunctivae normal.   Cardiovascular:      Rate and Rhythm: Regular rhythm. Tachycardia present. Pulses: Normal pulses. Pulmonary:      Effort: Pulmonary effort is normal.      Breath sounds: Normal breath sounds. Abdominal:      General: Abdomen is flat. Palpations: Abdomen is soft. Genitourinary:     Vagina: Vaginal discharge: 2+ DP pulses bilaterally, normal capillary refill BL lower extremities. Musculoskeletal:         General: No deformity. Right lower leg: Edema (significant non pitting edema, erythema, warmth to lower extremity, large R knee effusion) present. Left lower leg: No edema.       Comments: Pain with passive range of motion of R knee   Skin: General: Skin is warm and dry. Capillary Refill: Capillary refill takes less than 2 seconds. Comments: Large wound to dorsum of right forearm with purulence overlying granulation tissue, mild hyperemia surrounding wound, mild warmth tenderness, no induration or fluctuance   Neurological:      Mental Status: She is alert and oriented to person, place, and time. Mental status is at baseline. Psychiatric:         Behavior: Behavior normal.         Thought Content: Thought content normal.            Diagnostic Study Results   Labs  Recent Results (from the past 12 hour(s))   EKG, 12 LEAD, INITIAL    Collection Time: 08/13/22  3:34 PM   Result Value Ref Range    Ventricular Rate 92 BPM    Atrial Rate 92 BPM    P-R Interval 132 ms    QRS Duration 80 ms    Q-T Interval 370 ms    QTC Calculation (Bezet) 457 ms    Calculated P Axis 53 degrees    Calculated R Axis 33 degrees    Calculated T Axis 46 degrees    Diagnosis       Normal sinus rhythm  When compared with ECG of 07-JUL-2021 21:24,  No significant change was found     METABOLIC PANEL, COMPREHENSIVE    Collection Time: 08/13/22  4:17 PM   Result Value Ref Range    Sodium 137 136 - 145 mmol/L    Potassium 2.9 (L) 3.5 - 5.1 mmol/L    Chloride 99 97 - 108 mmol/L    CO2 31 21 - 32 mmol/L    Anion gap 7 5 - 15 mmol/L    Glucose 83 65 - 100 mg/dL    BUN 8 6 - 20 MG/DL    Creatinine 0.79 0.55 - 1.02 MG/DL    BUN/Creatinine ratio 10 (L) 12 - 20      GFR est AA >60 >60 ml/min/1.73m2    GFR est non-AA >60 >60 ml/min/1.73m2    Calcium 9.2 8.5 - 10.1 MG/DL    Bilirubin, total 0.3 0.2 - 1.0 MG/DL    ALT (SGPT) 9 (L) 12 - 78 U/L    AST (SGOT) 27 15 - 37 U/L    Alk.  phosphatase 80 45 - 117 U/L    Protein, total 9.5 (H) 6.4 - 8.2 g/dL    Albumin 1.5 (L) 3.5 - 5.0 g/dL    Globulin 8.0 (H) 2.0 - 4.0 g/dL    A-G Ratio 0.2 (L) 1.1 - 2.2     CBC WITH AUTOMATED DIFF    Collection Time: 08/13/22  4:17 PM   Result Value Ref Range    WBC 10.6 3.6 - 11.0 K/uL    RBC 3.91 3.80 - 5.20 M/uL    HGB 8.5 (L) 11.5 - 16.0 g/dL    HCT 28.2 (L) 35.0 - 47.0 %    MCV 72.1 (L) 80.0 - 99.0 FL    MCH 21.7 (L) 26.0 - 34.0 PG    MCHC 30.1 30.0 - 36.5 g/dL    RDW 17.9 (H) 11.5 - 14.5 %    PLATELET 541 (H) 616 - 400 K/uL    MPV 8.9 8.9 - 12.9 FL    NRBC 0.0 0  WBC    ABSOLUTE NRBC 0.00 0.00 - 0.01 K/uL    NEUTROPHILS 65 32 - 75 %    LYMPHOCYTES 24 12 - 49 %    MONOCYTES 8 5 - 13 %    EOSINOPHILS 3 0 - 7 %    BASOPHILS 0 0 - 1 %    IMMATURE GRANULOCYTES 0 0.0 - 0.5 %    ABS. NEUTROPHILS 6.9 1.8 - 8.0 K/UL    ABS. LYMPHOCYTES 2.5 0.8 - 3.5 K/UL    ABS. MONOCYTES 0.9 0.0 - 1.0 K/UL    ABS. EOSINOPHILS 0.3 0.0 - 0.4 K/UL    ABS. BASOPHILS 0.0 0.0 - 0.1 K/UL    ABS. IMM. GRANS. 0.0 0.00 - 0.04 K/UL    DF AUTOMATED       Radiologic Studies  CT LOW EXT RT WO CONT   Final Result   1. Inflammatory versus septic arthritis is subacute or chronic. 2. No fracture. 3. No drainable extra-articular abscess. DUPLEX LOWER EXT VENOUS RIGHT    (Results Pending)     CT Results  (Last 48 hours)                 08/13/22 1819  CT LOW EXT RT WO CONT Final result    Impression:  1. Inflammatory versus septic arthritis is subacute or chronic. 2. No fracture. 3. No drainable extra-articular abscess. Narrative:  EXAM: CT LOW EXT RT WO CONT       INDICATION: Right knee swelling and clinical diagnosis of infection. Normal   white blood cell count. Evaluate for abscess. COMPARISON: Right knee views on 1/4/2013       TECHNIQUE: Helical CT of the right knee with coronal and sagittal reformats. Images reviewed in soft tissue and bone windows. CT dose reduction was achieved   through the use of a standardized protocol tailored for this examination and   automatic exposure control for dose modulation. CONTRAST: None. FINDINGS: Bones: Bones are osteopenic. No acute fracture. There are erosions of   the subchondral distal femur and proximal tibia. Minimal erosions in the   patella.        Joint fluid: Large knee joint effusion. Articulations: Tricompartmental joint space narrowing. Tricompartmental   erosions. No chondrocalcinosis. Tendons: Extensor mechanism is intact. Muscles: Moderate diffuse atrophy. Soft tissue mass: No mass. No drainable fluid collection in the extra-articular   space. CXR Results  (Last 48 hours)      None            Medical Decision Making   I am the first provider for this patient. I reviewed the vital signs, available nursing notes, past medical history, past surgical history, family history and social history. Vital Signs-Reviewed the patient's vital signs. Patient Vitals for the past 12 hrs:   Temp Pulse Resp BP SpO2   08/13/22 1324 98 °F (36.7 °C) (!) 126 18 (!) 124/96 96 %     Records Reviewed: Nursing Notes and Old Medical Records    Provider Notes (Medical Decision Making):   DDx DVT, cellulitis, effusion either traumatic hemarthrosis versus septic arthritis no    She is tachycardic on arrival to 126, possibly pain related. Afebrile not tachypneic. Denies chest pain or dyspnea, O2 sat 96%, doubt PE. Will obtain duplex ultrasound. In setting of purulent appearing wound on arm with tachycardia will obtain blood cultures lactic acid CBC CMP. If duplex is negative, will pursue CT imaging without contrast given impressive erythematous swollen right leg to evaluate for underlying soft tissue infection. ED Course:   Initial assessment performed. The patients presenting problems have been discussed, and they are in agreement with the care plan formulated and outlined with them. I have encouraged them to ask questions as they arise throughout their visit.     ED Course as of 10/06/22 1515   Sat Aug 13, 2022   1540 EKG shows normal sinus rhythm at a rate of 92 normal axis normal intervals no STEMI no peak T waves [WB]   1541 Patient with history of IV drug use, multiple track marks [WB]   1542 Heart rate improved to 92 without intervention [WB]   1633 White count is normal [WB]   1634 Hemoglobin 8.5 from 11.7 one year ago, no blood loss by history [WB]   1634 Mild thrombocytosis, differential is normal with no immature granulocytes, no bands [WB]   1705 Potassium is mildly low at 2.9 otherwise normal electrolytes, normal renal function [WB]   1705 Lactate 1.5 [WB]   1733 Lower extremity duplex is negative for DVT or thrombophlebitis. Left common femoral vein thrombus free. Technically difficult exam due to edema and inability to withstand probe pressure [WB]   1749 CT will also be helpful to evaluate for osseous injury [WB]   1831 I spoke with history of asthma patient recently moved back from [place] about 1 week ago. During that time she was apparently diagnosed with stage II cervical cancer [WB]   1852 CT shows osteopenia, erosions of the subchondral distal femur and proximal tibia as well as erosions in the patella. Large knee joint effusion. Extensor mechanism is intact on CT. [WB]   1859 Repeat heart rate 80s. [WB]   1903 I attempted to consent the patient for arthrocentesis and she refused. She states she is amenable to having an orthopedic surgeon perform the procedure [WB]   1903 I have a high clinical suspicion for septic arthritis given her risk factors and exam [WB]   1905 Repeat heart rate on my evaluation is in the 80s [WB]   1912 Spoke with Dr Gary Fernandes, accepted to telemetry bed [WB]   Sun Aug 14, 2022   0704 Patient still in ED awaiting transfer, pharmacy consult placed for vancomycin dosing [WB]   2002 Received patient in signout, given patient is waiting 30 hours for bed, spoke to ED physician, Dr. Mandy Rushing for ED-to-Ed transfer.  [MB]      ED Course User Index  [MB] Le Butterfield MD  [WB] Neelam Smith MD     CRITICAL CARE NOTE :    7:24 PM    IMPENDING DETERIORATION -Cardiovascular  ASSOCIATED RISK FACTORS - Loss of limb  MANAGEMENT- Bedside Assessment, Supervision of Care, and Transfer  INTERPRETATION -  CT Scan  INTERVENTIONS - IV antibiotics, transfer  CASE REVIEW - Hospitalist/Intensivist and Nursing  TREATMENT RESPONSE -Stable  PERFORMED BY - Self    NOTES   :  I have spent 45 minutes of critical care time involved in lab review, consultations with specialist, family decision- making, bedside attention and documentation. This time excludes time spent in any separate billed procedures. During this entire length of time I was immediately available to the patient . Nahid Stone MD    Disposition:  Transfer Note:   Patient is being transferred to [facility] ED. Transfer was accepted by Dr. Sean Hernandez. The reasons for their transfer have been discussed with them and available family. They convey agreement and understanding for the need to be transferred as explained to them by me. Diagnosis     Clinical Impression:   1. Cellulitis of right upper extremity    2. Pyogenic arthritis of right knee joint, due to unspecified organism [facility])      Attestations:    Nahid Stone M.D. Please note that this dictation was completed with utoopia, the computer voice recognition software. Quite often unanticipated grammatical, syntax, homophones, and other interpretive errors are inadvertently transcribed by the computer software. Please disregard these errors. Please excuse any errors that have escaped final proofreading. Thank you.

## 2022-08-13 NOTE — ED NOTES
Pt presents ambulatory to ED complaining of right sided knee pain and swelling. Pt reports that she fell about a month ago and did not want to go to the hospital so it has gotten a lot worse than it originally was. Pt also has a visible wound with loss of sub q tissue on her right arm. Pt is not very cooperative and will only answer select questions. Pt is alert and oriented x 4, RR even and unlabored, skin is warm and dry. Assesment completed and pt updated on plan of care. Emergency Department Nursing Plan of Care       The Nursing Plan of Care is developed from the Nursing assessment and Emergency Department Attending provider initial evaluation. The plan of care may be reviewed in the ED Provider note.     The Plan of Care was developed with the following considerations:   Patient / Family readiness to learn indicated by:verbalized understanding  Persons(s) to be included in education: patient  Barriers to Learning/Limitations:No    Signed     Renato Saeed RN    8/13/2022   4:01 PM

## 2022-08-13 NOTE — ED NOTES
Bedside shift change report given to Mike Rodriguez (oncoming nurse) by Albert Cheek RN (offgoing nurse). Report included the following information SBAR and ED Summary.

## 2022-08-14 ENCOUNTER — HOSPITAL ENCOUNTER (INPATIENT)
Age: 47
LOS: 5 days | Discharge: HOME HEALTH | DRG: 813 | End: 2022-08-19
Attending: EMERGENCY MEDICINE | Admitting: HOSPITALIST
Payer: MEDICAID

## 2022-08-14 VITALS
BODY MASS INDEX: 23.9 KG/M2 | HEART RATE: 90 BPM | DIASTOLIC BLOOD PRESSURE: 99 MMHG | WEIGHT: 140 LBS | OXYGEN SATURATION: 100 % | HEIGHT: 64 IN | SYSTOLIC BLOOD PRESSURE: 134 MMHG | RESPIRATION RATE: 14 BRPM | TEMPERATURE: 98.1 F

## 2022-08-14 DIAGNOSIS — F19.10 POLYSUBSTANCE ABUSE (HCC): ICD-10-CM

## 2022-08-14 DIAGNOSIS — M00.061 STAPHYLOCOCCAL ARTHRITIS OF RIGHT KNEE (HCC): ICD-10-CM

## 2022-08-14 DIAGNOSIS — M00.9 PYOGENIC ARTHRITIS OF KNEE, DUE TO UNSPECIFIED ORGANISM, UNSPECIFIED LATERALITY (HCC): Primary | ICD-10-CM

## 2022-08-14 DIAGNOSIS — L08.9 WOUND INFECTION: ICD-10-CM

## 2022-08-14 DIAGNOSIS — Z88.1 HX OF ANTIBIOTIC ALLERGY: ICD-10-CM

## 2022-08-14 DIAGNOSIS — M00.9 PYOGENIC ARTHRITIS OF RIGHT KNEE JOINT, DUE TO UNSPECIFIED ORGANISM (HCC): ICD-10-CM

## 2022-08-14 DIAGNOSIS — Z53.29 REFUSAL OF CARE BY PATIENT: ICD-10-CM

## 2022-08-14 DIAGNOSIS — L03.113 RIGHT FOREARM CELLULITIS: ICD-10-CM

## 2022-08-14 DIAGNOSIS — T14.8XXA WOUND INFECTION: ICD-10-CM

## 2022-08-14 LAB
APPEARANCE UR: ABNORMAL
BACTERIA URNS QL MICRO: ABNORMAL /HPF
BILIRUB UR QL: NEGATIVE
COLOR UR: ABNORMAL
EPITH CASTS URNS QL MICRO: ABNORMAL /LPF
GLUCOSE UR STRIP.AUTO-MCNC: NEGATIVE MG/DL
HGB UR QL STRIP: NEGATIVE
KETONES UR QL STRIP.AUTO: NEGATIVE MG/DL
LEUKOCYTE ESTERASE UR QL STRIP.AUTO: ABNORMAL
NITRITE UR QL STRIP.AUTO: POSITIVE
PH UR STRIP: 7 [PH] (ref 5–8)
PROT UR STRIP-MCNC: ABNORMAL MG/DL
RBC #/AREA URNS HPF: ABNORMAL /HPF (ref 0–5)
SP GR UR REFRACTOMETRY: 1.02
UA: UC IF INDICATED,UAUC: ABNORMAL
UROBILINOGEN UR QL STRIP.AUTO: 1 EU/DL (ref 0.2–1)
WBC URNS QL MICRO: ABNORMAL /HPF (ref 0–4)

## 2022-08-14 PROCEDURE — 74011000250 HC RX REV CODE- 250: Performed by: HOSPITALIST

## 2022-08-14 PROCEDURE — 65270000029 HC RM PRIVATE

## 2022-08-14 PROCEDURE — 74011250636 HC RX REV CODE- 250/636: Performed by: EMERGENCY MEDICINE

## 2022-08-14 PROCEDURE — 99285 EMERGENCY DEPT VISIT HI MDM: CPT

## 2022-08-14 PROCEDURE — 74011000250 HC RX REV CODE- 250: Performed by: EMERGENCY MEDICINE

## 2022-08-14 PROCEDURE — 81001 URINALYSIS AUTO W/SCOPE: CPT

## 2022-08-14 PROCEDURE — 75810000054 HC ARTHOCENTISIS JOINT

## 2022-08-14 PROCEDURE — 96375 TX/PRO/DX INJ NEW DRUG ADDON: CPT

## 2022-08-14 PROCEDURE — 87086 URINE CULTURE/COLONY COUNT: CPT

## 2022-08-14 PROCEDURE — 96376 TX/PRO/DX INJ SAME DRUG ADON: CPT

## 2022-08-14 PROCEDURE — 74011250637 HC RX REV CODE- 250/637: Performed by: EMERGENCY MEDICINE

## 2022-08-14 RX ORDER — SODIUM CHLORIDE 0.9 % (FLUSH) 0.9 %
5-40 SYRINGE (ML) INJECTION EVERY 8 HOURS
Status: DISCONTINUED | OUTPATIENT
Start: 2022-08-14 | End: 2022-08-19 | Stop reason: HOSPADM

## 2022-08-14 RX ORDER — METHADONE HYDROCHLORIDE 10 MG/1
40 TABLET ORAL ONCE
Status: COMPLETED | OUTPATIENT
Start: 2022-08-14 | End: 2022-08-14

## 2022-08-14 RX ORDER — LINEZOLID 2 MG/ML
600 INJECTION, SOLUTION INTRAVENOUS EVERY 12 HOURS
Status: DISCONTINUED | OUTPATIENT
Start: 2022-08-14 | End: 2022-08-14

## 2022-08-14 RX ORDER — OXYCODONE HYDROCHLORIDE 5 MG/1
10 TABLET ORAL
Status: COMPLETED | OUTPATIENT
Start: 2022-08-14 | End: 2022-08-14

## 2022-08-14 RX ORDER — MAG HYDROX/ALUMINUM HYD/SIMETH 200-200-20
15 SUSPENSION, ORAL (FINAL DOSE FORM) ORAL
Status: DISCONTINUED | OUTPATIENT
Start: 2022-08-14 | End: 2022-08-19 | Stop reason: HOSPADM

## 2022-08-14 RX ORDER — LEVOFLOXACIN 5 MG/ML
750 INJECTION, SOLUTION INTRAVENOUS ONCE
Status: DISCONTINUED | OUTPATIENT
Start: 2022-08-14 | End: 2022-08-14 | Stop reason: HOSPADM

## 2022-08-14 RX ORDER — POLYETHYLENE GLYCOL 3350 17 G/17G
17 POWDER, FOR SOLUTION ORAL DAILY PRN
Status: DISCONTINUED | OUTPATIENT
Start: 2022-08-14 | End: 2022-08-19 | Stop reason: HOSPADM

## 2022-08-14 RX ORDER — PROMETHAZINE HYDROCHLORIDE 25 MG/1
12.5 TABLET ORAL
Status: DISCONTINUED | OUTPATIENT
Start: 2022-08-14 | End: 2022-08-19 | Stop reason: HOSPADM

## 2022-08-14 RX ORDER — KETOROLAC TROMETHAMINE 30 MG/ML
15 INJECTION, SOLUTION INTRAMUSCULAR; INTRAVENOUS
Status: COMPLETED | OUTPATIENT
Start: 2022-08-14 | End: 2022-08-14

## 2022-08-14 RX ORDER — LIDOCAINE HYDROCHLORIDE 10 MG/ML
5 INJECTION, SOLUTION EPIDURAL; INFILTRATION; INTRACAUDAL; PERINEURAL ONCE
Status: COMPLETED | OUTPATIENT
Start: 2022-08-14 | End: 2022-08-14

## 2022-08-14 RX ORDER — ACETAMINOPHEN 325 MG/1
650 TABLET ORAL
Status: DISCONTINUED | OUTPATIENT
Start: 2022-08-14 | End: 2022-08-19 | Stop reason: HOSPADM

## 2022-08-14 RX ORDER — ONDANSETRON 2 MG/ML
4 INJECTION INTRAMUSCULAR; INTRAVENOUS
Status: DISCONTINUED | OUTPATIENT
Start: 2022-08-14 | End: 2022-08-19 | Stop reason: HOSPADM

## 2022-08-14 RX ORDER — HYDROCODONE BITARTRATE AND ACETAMINOPHEN 5; 325 MG/1; MG/1
1 TABLET ORAL
Status: DISCONTINUED | OUTPATIENT
Start: 2022-08-14 | End: 2022-08-19 | Stop reason: HOSPADM

## 2022-08-14 RX ORDER — SODIUM CHLORIDE 0.9 % (FLUSH) 0.9 %
5-40 SYRINGE (ML) INJECTION AS NEEDED
Status: DISCONTINUED | OUTPATIENT
Start: 2022-08-14 | End: 2022-08-19 | Stop reason: HOSPADM

## 2022-08-14 RX ORDER — ACETAMINOPHEN 650 MG/1
650 SUPPOSITORY RECTAL
Status: DISCONTINUED | OUTPATIENT
Start: 2022-08-14 | End: 2022-08-19 | Stop reason: HOSPADM

## 2022-08-14 RX ADMIN — LIDOCAINE HYDROCHLORIDE 5 ML: 10 INJECTION, SOLUTION EPIDURAL; INFILTRATION; INTRACAUDAL; PERINEURAL at 23:10

## 2022-08-14 RX ADMIN — KETOROLAC TROMETHAMINE 15 MG: 30 INJECTION, SOLUTION INTRAMUSCULAR; INTRAVENOUS at 10:02

## 2022-08-14 RX ADMIN — OXYCODONE HYDROCHLORIDE 10 MG: 5 TABLET ORAL at 10:02

## 2022-08-14 RX ADMIN — METHADONE HYDROCHLORIDE 40 MG: 10 TABLET ORAL at 13:53

## 2022-08-14 RX ADMIN — SODIUM CHLORIDE, PRESERVATIVE FREE 10 ML: 5 INJECTION INTRAVENOUS at 22:47

## 2022-08-14 RX ADMIN — SODIUM CHLORIDE 500 MG: 9 INJECTION INTRAMUSCULAR; INTRAVENOUS; SUBCUTANEOUS at 14:14

## 2022-08-14 NOTE — ED NOTES
After Visit Summary   2017    Elliot Norris    MRN: [id number]           Patient Information     Date Of Birth          1960        Visit Information        Provider Department      2017 7:15 AM Elliot Huizar MD [facility]        Today's Diagnoses     Basal cell carcinoma, forehead    -  1    Basal cell carcinoma of left forearm        Lentigo        SK (seborrheic keratosis)        Dermal nevus          Care Instructions      Sutured Wound Care     [facility]: [phone]    FOREHEAD          ? No strenuous activity for 48 hours. Resume moderate activity in 48 hours. No heavy exercising until you are seen for follow up in one week.     ? Take Tylenol as needed for discomfort.                         ? Do not drink alcoholic beverages for 48 hours.     ? Keep the pressure bandage in place for 24 hours. If the bandage becomes blood tinged or loose, reinforce it with gauze and tape.        (Refer to the reverse side of this page for management of bleeding).    ? Remove pressure bandage in 24 hours     ? Leave the flat bandage in place until your follow up appointment.    ? Keep the bandage dry. Wash around it carefully.    ? If the tape becomes soiled or starts to come off, reinforce it with additional paper tape.    ? Do not smoke for 3 weeks; smoking is detrimental to wound healing.    ? It is normal to have swelling and bruising around the surgical site. The bruising will fade in approximately 10-14 days. Elevate the area to reduce swelling.    ? Numbness, itchiness and sensitivity to temperature changes can occur after surgery and may take up to 18 months to normalize.      POSSIBLE COMPLICATIONS    BLEEDIN. Leave the bandage in place.  2. Use tightly rolled up gauze or a cloth to apply direct pressure over the bandage for 20   minutes.  3. Reapply pressure for an additional 20 minutes if necessary  4. Call the office or go  Pt refusing second dose of vancomycin. RN explained to pt the importance of the medication. Pt still refusing. MD aware. to the nearest emergency room if pressure fails to stop the bleeding.  5. Use additional gauze and tape to maintain pressure once the bleeding has stopped.        PAIN:    1. Post operative pain should slowly get better, never worse.  2. A severe increase in pain may indicate a problem. Call the office if this occurs.    In case of emergency phone:Dr Huizar [phone]      Open Wound Care     for LEFT ARM        ? No strenuous activity for 48 hours    ? Take Tylenol as needed for discomfort.                                                .         ? Do not drink alcoholic beverages for 48 hours.    ? Keep the pressure bandage in place for 24 hours. If the bandage becomes blood tinged or loose, reinforce it with gauze and tape.        (Refer to the reverse side of this page for management of bleeding).    ? Remove bandage in 24 hours and begin wound care as follows:     1. Clean area with tap water using a Q tip or gauze pad, (shower / bathe normally)  2. Dry wound with Q tip or gauze pad  3. Apply Aquaphor, Vaseline, Polysporin or Bacitracin Ointment with a Q tip    Do NOT use Neosporin Ointment *  4. Cover the wound with a band-aid or nonstick gauze pad and paper tape.  5. Repeat wound care once a day until wound is completely healed.    It is an old wives tale that a wound heals better when it is exposed to air and allowed to dry out. The wound will heal faster with a better cosmetic result if it is kept moist with ointment and covered with a bandage.  Do not let the wound dry out.      Supplies Needed:                Qtips or gauze pads                Polysporin or Bacitracin Ointment                Bandaids or nonstick gauze pads and paper tape    Wound care kits and brown paper tape are available for purchase at   the pharmacy.    BLEEDIN. Use tightly rolled up gauze or cloth to apply direct pressure over the bandage for 20   minutes.  2. Reapply pressure for an additional 20 minutes if  necessary  3. Call the office or go to the nearest emergency room if pressure fails to stop the bleeding.  4. Use additional gauze and tape to maintain pressure once the bleeding has stopped.  5. Begin wound care 24 hours after surgery as directed.                  WOUND HEALING    1. One week after surgery a pink / red halo will form around the outside of the wound.   This is new skin.  2. The center of the wound will appear yellowish white and produce some drainage.  3. The pink halo will slowly migrate in toward the center of the wound until the wound is covered with new shiny pink skin.  4. There will be no more drainage when the wound is completely healed.  5. It will take six months to one year for the redness to fade.  6. The scar may be itchy, tight and sensitive to extreme temperatures for a year after the surgery.  7. Massaging the area several times a day for several minutes after the wound is completely healed will help the scar soften and normalize faster. Begin massage only after healing is complete.      In case of emergency call: Dr Huizar: [phone]     [facility]: [phone]                Follow-ups after your visit        Who to contact     If you have questions or need follow up information about today's clinic visit or your schedule please contact [facility] directly at [phone].  Normal or non-critical lab and imaging results will be communicated to you by MyChart, letter or phone within 4 business days after the clinic has received the results. If you do not hear from us within 7 days, please contact the clinic through MyChart or phone. If you have a critical or abnormal lab result, we will notify you by phone as soon as possible.  Submit refill requests through [employer] or call your pharmacy and they will forward the refill request to us. Please allow 3 business days for your refill to be completed.          Additional Information About Your Visit         [employer] Information     [employer] gives you secure access to your electronic health record. If you see a primary care provider, you can also send messages to your care team and make appointments. If you have questions, please call your primary care clinic.  If you do not have a primary care provider, please call [phone] and they will assist you.        Care EveryWhere ID     This is your Care EveryWhere ID. This could be used by other organizations to access your [place] medical records  [id number]        Your Vitals Were     Pulse Height                77 1.829 m (6')           Blood Pressure from Last 3 Encounters:   09/14/17 133/90   06/19/17 145/85   04/17/17 (!) 134/91    Weight from Last 3 Encounters:   06/19/17 108.4 kg (239 lb)   04/17/17 108 kg (238 lb)   12/06/16 109.3 kg (241 lb)              We Performed the Following     BIOPSY SKIN/SUBQ/MUC MEM, SINGLE LESION     CL FROZEN SECTION FIRST SPEC     MOHS HEAD/NCK/HND/FT/GEN 1ST STAGE UP T0 5 BLOCKS     MOHS HEAD/NCK/HND/FT/GEN EA ADDTL STAGE UP T0 5 BLOCKS     MOHS TRUNK/ARM/LEG 1ST STAGE UP T0 5 BLOCKS     REPAIR COMPLEX, WOUND HEAD/AXIL/GEN/HND/FT 2.6-7.5 CM        Primary Care Provider Office Phone # Fax #    Paulino Adrián Roberto [phone] [address] [facility] [address]        Equal Access to Services     LIS JACKSON : Hadii aad ku hadasho Soomaali, waaxda luqadaha, qaybta kaalmada adeegyada, werner jeronimo . So [facility] [phone].    ATENCIÓN: Si habla azulañol, tiene a pinon disposición servicios gratuitos de asistencia lingüística. Llame al [phone].    We comply with applicable federal civil rights laws and [place] laws. We do not discriminate on the basis of race, color, national origin, age, disability sex, sexual orientation or gender identity.            Thank you!     Thank you for choosing [facility]  for your care. Our goal is always to  provide you with excellent care. Hearing back from our patients is one way we can continue to improve our services. Please take a few minutes to complete the written survey that you may receive in the mail after your visit with us. Thank you!             Your Updated Medication List - Protect others around you: Learn how to safely use, store and throw away your medicines at www.disposemymeds.org.          This list is accurate as of: 9/14/17 10:31 AM.  Always use your most recent med list.                   Brand Name Dispense Instructions for use Diagnosis    BENADRYL 25 MG tablet   Generic drug:  diphenhydrAMINE      Take 25 mg by mouth At Bedtime        simvastatin 20 MG tablet    ZOCOR    90 tablet    Take 1 tablet (20 mg) by mouth At Bedtime    Hyperlipidemia LDL goal <160

## 2022-08-14 NOTE — ED NOTES
Family at bedside requesting to speak with provider regarding pain management. Family and pt were made aware that pt was medicated for her pain at 200 am. Per family member 8 of oxycodone is not enough for pt as she uses heroin everyday. Informed that there are protocols for pain management and that this RN has been assessing patients pain at regular intervals and given meds when they are due. Pt still refusing antibiotic treatment. MD made aware and to see pt and family at bedside.

## 2022-08-14 NOTE — ED NOTES
Assumed care of patient, received report from previous RN. Patient resting in bed in no acute distress. Will continue to monitor and treat per provider orders.

## 2022-08-14 NOTE — PROGRESS NOTES
[address] [address] [facility] Pharmacy Progress Note: Antimicrobial Stewardship  Consult for dosing of vancomycin by Dr. Penny Juarez  Indication: Septic arthritis  Day of Therapy: 1    Plan:  Vancomycin therapy:  Loading dose of vancomycin 1500 mg IV  was ordered last night. Patient received 750 mg of that dose and refused the second 750 mg. Follow with maintenance dose of vancomycin 1000 mg IV every 12 hours. Per notes, patient continues to refuse antimicrobial therapy. Dose calculated to approximate a target AUC/MADHAVI of 400-600     Pharmacy to follow daily and will make changes to dose and/or frequency based on clinical status. Other Antimicrobial  (not dosed by pharmacist)   Levofloxacin 750 mg IV x 1 on 8/13 at 22:00   Cultures     8/13 blood: In process  8/13 urine: In process   Serum Creatinine     Lab Results   Component Value Date/Time    Creatinine 0.79 08/13/2022 04:17 PM       Creatinine Clearance Estimated Creatinine Clearance: 76.8 mL/min (based on SCr of 0.79 mg/dL). Procalcitonin  No results found for: PCT   Temp 98 °F (36.7 °C)    WBC   Lab Results   Component Value Date/Time    WBC 10.6 08/13/2022 04:17 PM       For Antifungals, Metronidazole and Nafcillin: Lab Results   Component Value Date/Time    ALT (SGPT) 9 (L) 08/13/2022 04:17 PM    AST (SGOT) 27 08/13/2022 04:17 PM    Alk.  phosphatase 80 08/13/2022 04:17 PM    Bilirubin, total 0.3 08/13/2022 04:17 PM         Thank you,  Radha Ortega, PharmD, BCPS  [phone]

## 2022-08-14 NOTE — ED NOTES
Bedside and Verbal shift change report given to Ismael Castillo (oncoming nurse) by Loni Tyler RN (offgoing nurse). Report included the following information SBAR, Kardex, ED Summary, Procedure Summary and MAR.

## 2022-08-14 NOTE — ED NOTES
Patient refused antibiotic, this nurse tried to educate patient on importance of taking this medication. Patient continued to refused, provider notified.

## 2022-08-14 NOTE — ED NOTES
Pt medicated per emar. Pt allowed this RN to place pulse ox. Pt given lunch tray. W/o injury upon exiting room.

## 2022-08-14 NOTE — ED NOTES
Patient ordered lunch. No complaints will continue to monitor. Pt still resfusing vancomycin, stating it made her throat hurt and made her feel jittery.

## 2022-08-14 NOTE — ED NOTES
Transfer Center called. [facility] is on diversion and olga black. States the pt will be held in ED until room is available.

## 2022-08-14 NOTE — ED NOTES
Verbal shift change report given to [phone] (oncoming nurse) by Constance Santos (offgoing nurse). Report included the following information SBAR, Kardex, ED Summary, Procedure Summary, MAR and Recent Results.

## 2022-08-15 PROBLEM — L03.113 RIGHT FOREARM CELLULITIS: Status: ACTIVE | Noted: 2022-08-15

## 2022-08-15 PROBLEM — D64.9 ANEMIA: Status: ACTIVE | Noted: 2022-08-15

## 2022-08-15 PROBLEM — E87.6 HYPOKALEMIA: Status: ACTIVE | Noted: 2022-08-15

## 2022-08-15 LAB
ALBUMIN SERPL-MCNC: 1.3 G/DL (ref 3.5–5)
ALBUMIN SERPL-MCNC: 1.5 G/DL (ref 3.5–5)
ALBUMIN/GLOB SERPL: 0.2 {RATIO} (ref 1.1–2.2)
ALBUMIN/GLOB SERPL: 0.2 {RATIO} (ref 1.1–2.2)
ALP SERPL-CCNC: 70 U/L (ref 45–117)
ALP SERPL-CCNC: 76 U/L (ref 45–117)
ALT SERPL-CCNC: 8 U/L (ref 12–78)
ALT SERPL-CCNC: 8 U/L (ref 12–78)
ANION GAP SERPL CALC-SCNC: 7 MMOL/L (ref 5–15)
ANION GAP SERPL CALC-SCNC: 8 MMOL/L (ref 5–15)
AST SERPL-CCNC: 24 U/L (ref 15–37)
AST SERPL-CCNC: 24 U/L (ref 15–37)
ATRIAL RATE: 92 BPM
BACTERIA SPEC CULT: NORMAL
BASOPHILS # BLD: 0 K/UL (ref 0–0.1)
BASOPHILS # BLD: 0 K/UL (ref 0–0.1)
BASOPHILS NFR BLD: 0 % (ref 0–1)
BASOPHILS NFR BLD: 0 % (ref 0–1)
BILIRUB SERPL-MCNC: 0.4 MG/DL (ref 0.2–1)
BILIRUB SERPL-MCNC: 0.5 MG/DL (ref 0.2–1)
BUN SERPL-MCNC: 7 MG/DL (ref 6–20)
BUN SERPL-MCNC: 8 MG/DL (ref 6–20)
BUN/CREAT SERPL: 10 (ref 12–20)
BUN/CREAT SERPL: 13 (ref 12–20)
CALCIUM SERPL-MCNC: 8.8 MG/DL (ref 8.5–10.1)
CALCIUM SERPL-MCNC: 9.2 MG/DL (ref 8.5–10.1)
CALCULATED P AXIS, ECG09: 53 DEGREES
CALCULATED R AXIS, ECG10: 33 DEGREES
CALCULATED T AXIS, ECG11: 46 DEGREES
CC UR VC: NORMAL
CHLORIDE SERPL-SCNC: 104 MMOL/L (ref 97–108)
CHLORIDE SERPL-SCNC: 105 MMOL/L (ref 97–108)
CO2 SERPL-SCNC: 25 MMOL/L (ref 21–32)
CO2 SERPL-SCNC: 25 MMOL/L (ref 21–32)
CREAT SERPL-MCNC: 0.63 MG/DL (ref 0.55–1.02)
CREAT SERPL-MCNC: 0.68 MG/DL (ref 0.55–1.02)
CRP SERPL-MCNC: 11.9 MG/DL (ref 0–0.6)
DIAGNOSIS, 93000: NORMAL
DIFFERENTIAL METHOD BLD: ABNORMAL
DIFFERENTIAL METHOD BLD: ABNORMAL
EOSINOPHIL # BLD: 0.6 K/UL (ref 0–0.4)
EOSINOPHIL # BLD: 0.6 K/UL (ref 0–0.4)
EOSINOPHIL NFR BLD: 7 % (ref 0–7)
EOSINOPHIL NFR BLD: 8 % (ref 0–7)
ERYTHROCYTE [DISTWIDTH] IN BLOOD BY AUTOMATED COUNT: 18.1 % (ref 11.5–14.5)
ERYTHROCYTE [DISTWIDTH] IN BLOOD BY AUTOMATED COUNT: 18.1 % (ref 11.5–14.5)
ERYTHROCYTE [SEDIMENTATION RATE] IN BLOOD: >140 MM/HR (ref 0–20)
GLOBULIN SER CALC-MCNC: 7.2 G/DL (ref 2–4)
GLOBULIN SER CALC-MCNC: 7.5 G/DL (ref 2–4)
GLUCOSE SERPL-MCNC: 78 MG/DL (ref 65–100)
GLUCOSE SERPL-MCNC: 80 MG/DL (ref 65–100)
HCT VFR BLD AUTO: 26.4 % (ref 35–47)
HCT VFR BLD AUTO: 28.8 % (ref 35–47)
HGB BLD-MCNC: 7.9 G/DL (ref 11.5–16)
HGB BLD-MCNC: 8.6 G/DL (ref 11.5–16)
IMM GRANULOCYTES # BLD AUTO: 0 K/UL (ref 0–0.04)
IMM GRANULOCYTES # BLD AUTO: 0 K/UL (ref 0–0.04)
IMM GRANULOCYTES NFR BLD AUTO: 0 % (ref 0–0.5)
IMM GRANULOCYTES NFR BLD AUTO: 0 % (ref 0–0.5)
IRON SATN MFR SERPL: 20 % (ref 20–50)
IRON SERPL-MCNC: 31 UG/DL (ref 35–150)
LACTATE BLD-SCNC: 0.95 MMOL/L (ref 0.4–2)
LYMPHOCYTES # BLD: 1 K/UL (ref 0.8–3.5)
LYMPHOCYTES # BLD: 1.3 K/UL (ref 0.8–3.5)
LYMPHOCYTES NFR BLD: 12 % (ref 12–49)
LYMPHOCYTES NFR BLD: 16 % (ref 12–49)
MCH RBC QN AUTO: 21.3 PG (ref 26–34)
MCH RBC QN AUTO: 21.3 PG (ref 26–34)
MCHC RBC AUTO-ENTMCNC: 29.9 G/DL (ref 30–36.5)
MCHC RBC AUTO-ENTMCNC: 29.9 G/DL (ref 30–36.5)
MCV RBC AUTO: 71.2 FL (ref 80–99)
MCV RBC AUTO: 71.3 FL (ref 80–99)
MONOCYTES # BLD: 0.2 K/UL (ref 0–1)
MONOCYTES # BLD: 0.4 K/UL (ref 0–1)
MONOCYTES NFR BLD: 3 % (ref 5–13)
MONOCYTES NFR BLD: 5 % (ref 5–13)
NEUTS SEG # BLD: 5.9 K/UL (ref 1.8–8)
NEUTS SEG # BLD: 6.2 K/UL (ref 1.8–8)
NEUTS SEG NFR BLD: 72 % (ref 32–75)
NEUTS SEG NFR BLD: 77 % (ref 32–75)
NRBC # BLD: 0 K/UL (ref 0–0.01)
NRBC # BLD: 0 K/UL (ref 0–0.01)
NRBC BLD-RTO: 0 PER 100 WBC
NRBC BLD-RTO: 0 PER 100 WBC
P-R INTERVAL, ECG05: 132 MS
PLATELET # BLD AUTO: 443 K/UL (ref 150–400)
PLATELET # BLD AUTO: 458 K/UL (ref 150–400)
PMV BLD AUTO: 8.4 FL (ref 8.9–12.9)
PMV BLD AUTO: 8.8 FL (ref 8.9–12.9)
POTASSIUM SERPL-SCNC: 3.3 MMOL/L (ref 3.5–5.1)
POTASSIUM SERPL-SCNC: 4 MMOL/L (ref 3.5–5.1)
PROT SERPL-MCNC: 8.5 G/DL (ref 6.4–8.2)
PROT SERPL-MCNC: 9 G/DL (ref 6.4–8.2)
Q-T INTERVAL, ECG07: 370 MS
QRS DURATION, ECG06: 80 MS
QTC CALCULATION (BEZET), ECG08: 457 MS
RBC # BLD AUTO: 3.71 M/UL (ref 3.8–5.2)
RBC # BLD AUTO: 4.04 M/UL (ref 3.8–5.2)
SERVICE CMNT-IMP: NORMAL
SODIUM SERPL-SCNC: 136 MMOL/L (ref 136–145)
SODIUM SERPL-SCNC: 138 MMOL/L (ref 136–145)
TIBC SERPL-MCNC: 152 UG/DL (ref 250–450)
VENTRICULAR RATE, ECG03: 92 BPM
WBC # BLD AUTO: 8.1 K/UL (ref 3.6–11)
WBC # BLD AUTO: 8.2 K/UL (ref 3.6–11)

## 2022-08-15 PROCEDURE — 83540 ASSAY OF IRON: CPT

## 2022-08-15 PROCEDURE — 85652 RBC SED RATE AUTOMATED: CPT

## 2022-08-15 PROCEDURE — 65270000046 HC RM TELEMETRY

## 2022-08-15 PROCEDURE — 74011000258 HC RX REV CODE- 258: Performed by: HOSPITALIST

## 2022-08-15 PROCEDURE — 36415 COLL VENOUS BLD VENIPUNCTURE: CPT

## 2022-08-15 PROCEDURE — 83605 ASSAY OF LACTIC ACID: CPT

## 2022-08-15 PROCEDURE — 93971 EXTREMITY STUDY: CPT | Performed by: INTERNAL MEDICINE

## 2022-08-15 PROCEDURE — 74011250637 HC RX REV CODE- 250/637: Performed by: INTERNAL MEDICINE

## 2022-08-15 PROCEDURE — 80053 COMPREHEN METABOLIC PANEL: CPT

## 2022-08-15 PROCEDURE — 74011000250 HC RX REV CODE- 250: Performed by: HOSPITALIST

## 2022-08-15 PROCEDURE — 85025 COMPLETE CBC W/AUTO DIFF WBC: CPT

## 2022-08-15 PROCEDURE — 74011250636 HC RX REV CODE- 250/636: Performed by: HOSPITALIST

## 2022-08-15 PROCEDURE — 74011250637 HC RX REV CODE- 250/637: Performed by: HOSPITALIST

## 2022-08-15 PROCEDURE — 99221 1ST HOSP IP/OBS SF/LOW 40: CPT | Performed by: ORTHOPAEDIC SURGERY

## 2022-08-15 PROCEDURE — 86140 C-REACTIVE PROTEIN: CPT

## 2022-08-15 RX ORDER — METHADONE HYDROCHLORIDE 10 MG/ML
15 CONCENTRATE ORAL DAILY
Status: COMPLETED | OUTPATIENT
Start: 2022-08-16 | End: 2022-08-17

## 2022-08-15 RX ORDER — METHADONE HYDROCHLORIDE 10 MG/ML
30 CONCENTRATE ORAL ONCE
Status: COMPLETED | OUTPATIENT
Start: 2022-08-15 | End: 2022-08-15

## 2022-08-15 RX ADMIN — METHADONE HYDROCHLORIDE 30 MG: 10 CONCENTRATE ORAL at 12:59

## 2022-08-15 RX ADMIN — SODIUM CHLORIDE 2 G: 9 INJECTION INTRAMUSCULAR; INTRAVENOUS; SUBCUTANEOUS at 00:55

## 2022-08-15 RX ADMIN — HYDROCODONE BITARTRATE AND ACETAMINOPHEN 1 TABLET: 5; 325 TABLET ORAL at 23:27

## 2022-08-15 RX ADMIN — CEFEPIME 2 G: 2 INJECTION, POWDER, FOR SOLUTION INTRAVENOUS at 06:47

## 2022-08-15 RX ADMIN — CEFEPIME 2 G: 2 INJECTION, POWDER, FOR SOLUTION INTRAVENOUS at 23:27

## 2022-08-15 RX ADMIN — SODIUM CHLORIDE, PRESERVATIVE FREE 10 ML: 5 INJECTION INTRAVENOUS at 21:55

## 2022-08-15 RX ADMIN — SODIUM CHLORIDE, PRESERVATIVE FREE 10 ML: 5 INJECTION INTRAVENOUS at 06:47

## 2022-08-15 NOTE — ED NOTES
Ortho PA at bedside at this time to perform procedure to obtain fluid from knee. Delay in labwork due to provider performing aspiration procedure. Refilled 3/5

## 2022-08-15 NOTE — ED NOTES
RN and ED tech attempted to obtain labs, pt hard stick. Ultrasound IV tech contacted at this time for assistance.

## 2022-08-15 NOTE — PROGRESS NOTES
End of Shift Note    Bedside shift change report given to  [facility] [place], RN (oncoming nurse) by Rosanne Mendoza RN (offgoing nurse). Report included the following information     Shift worked:  days   Shift summary and any significant changes:     Admit to NSTU       Concerns for physician to address:     Zone phone for oncoming shift:        Patient Information  Gatito Taylor  55 y.o.  8/14/2022 10:16 PM by Bean Luz MD. Gatito Taylor was admitted from ED    Problem List  Patient Active Problem List    Diagnosis Date Noted    Right forearm cellulitis 08/15/2022    Hypokalemia 08/15/2022    Anemia 08/15/2022    Septic arthritis ([facility] [address] 08/14/2022     Past Medical History:   Diagnosis Date    IV drug abuse ([facility] [address]        Core Measures: Activity:     Number times ambulated in hallways past shift:0  Cardiac:   Cardiac Monitoring: N          Access:   Current line(s):PIV  Genitourinary:   Urinary status: voiding  Urinary Catheter? P    Respiratory:     O2 Device: None  Chronic home O2 use?:   Incentive spirometer at bedside:     GI:              Pain Management:   Patient states pain is manageable on current regimen:     Skin:     Intervention    Patient Safety:  Fall Score:    Interventions:     @Rollbelt  @dexterity to release roll belt  Yes/No ( must document dexterity  here by stating Yes or No here, otherwise this is a restraint and must follow restraint documentation policy.)    DVT prophylaxis:  DVT prophylaxis Med-  DVT prophylaxis SCD or SHANICE    Wounds: (If Applicable)  Wounds-R arm , dressing D/I  Location    Active Consults:  IP CONSULT TO ORTHOPEDIC SURGERY    Length of Stay:  Expected LOS: - - -  Actual LOS: 1  Discharge Plan:

## 2022-08-15 NOTE — CONSULTS
Order received for consult. See previously completed Consult notes by Dr Andrew Bloom and Sagar Kohli PA-C.     THOMAS Borden

## 2022-08-15 NOTE — PROGRESS NOTES
CM attempted to speak with patient via room phone, continues to ring. Call placed to patient's mobile phone and it is disc. Call placed to patient's emergency contact, went straight to voice message.     2:43 PM  RAULITO Bedolla

## 2022-08-15 NOTE — ED NOTES
[facility] [facility] authority to transfer patient to [facility] ER. Report given to charge nurse Flores Pagan. Patient A&OX4 at time of transfer, VSS.

## 2022-08-15 NOTE — ASSESSMENT & PLAN NOTE
Potassium of 2.9 in the [facility] [place] emergency department. Unsure if repleted.   -Repeat BMP

## 2022-08-15 NOTE — ED NOTES
Progress Note:  Pt arrived from [address] [place] [address] as a transfer to swollen red hot knee, Pt had refused ED Attending at [facility] to tap knee she was transferred here. She had received Daptomycin prior to transfer. Pt hemodynamically stable, Right knee- swollen, hot erythematous, decrease range of motion. Will add on ESR, CRP, POC lac. Consult case discussed with Hospitalist who will see pt, asked for Ortho to be consulted.    Alexa Frias, DO

## 2022-08-15 NOTE — H&P
GENERAL GENERIC H&P/CONSULT    CC-R forearm wound, R knee swelling    Subjective:    49-year-old female with a past medical history significant for a prior right knee surgery who presents to the emergency department complaining of right knee swelling as well as a chronic right arm wound. She states that around 1 month ago, she fell twisting her right knee and scraping her right forearm. She reports that since this time, she has been having worsening of her right forearm wound as well as worsening swelling and redness of her right knee. It has gotten particularly bad in the last couple of days prompting an emergency room visit. She states that she has been having difficulty ambulating secondary to her right knee pain and swelling. Otherwise, she denies any fever/chills, headache, dizziness, chest pain, palpitations, cough, shortness of breath, abdominal pain or nausea/vomiting. She initially presented to the [facility] emergency department on 8/13. At that time, she was noted to be tachycardic with otherwise stable vital signs within normal limits. An initial CBC was significant for a white blood cell count of 10.6 and hemoglobin of 8.5. An initial CMP was significant for a potassium of 2.9. A CT scan of her right knee was concerning for inflammatory versus septic arthritis which is acute or chronic. She was given IV daptomycin and recommended that her right knee be drained however she requested that this be done by an orthopedic surgeon who was not present at [facility].  She was transferred to [facility] at this time. She will be admitted to the hospital service and orthopedic surgery will be consulted. Past Medical History:   Diagnosis Date    IV drug abuse [facility])       Past Surgical History:   Procedure Laterality Date    HX ORTHOPAEDIC      R knee      Prior to Admission medications    Medication Sig Start Date End Date Taking?  Authorizing Provider   clindamycin (CLEOCIN) 300 mg capsule Take 1 Capsule by mouth four (4) times daily for 7 days. 8/13/22 8/20/22  Jennifer Smith MD   HYDROcodone-acetaminophen [facility]) 5-325 mg per tablet Take 1 tablet by mouth every eight (8) hours as needed for Pain for up to 8 doses. 11/25/14   Sobia Dobbins MD   promethazine (PHENERGAN) 25 mg suppository Insert 1 suppository into rectum every six (6) hours as needed for Nausea for up to 7 doses. 11/25/14   Sobia Dobbins MD     Allergies   Allergen Reactions    Zofran [Ondansetron Hcl (Pf)] Anaphylaxis    Amoxicillin Unknown (comments)    Bactrim [Sulfamethoprim Ds] Unknown (comments)    Darvocet A500 [Propoxyphene N-Acetaminophen] Rash    Doxycycline Unknown (comments)    Penicillin V Unknown (comments)    Ultram [Tramadol] Unknown (comments)    Vancomycin Itching     Pt reports itching and lip swelling      Social History     Tobacco Use    Smoking status: Never    Smokeless tobacco: Never   Substance Use Topics    Alcohol use: No      Family History   Problem Relation Age of Onset    Hypertension Father       Review of Systems   Constitutional:  Negative for chills, fatigue and fever. HENT:  Negative for ear pain, rhinorrhea and sore throat. Eyes:  Negative for pain and discharge. Respiratory:  Negative for cough, shortness of breath and wheezing. Cardiovascular:  Negative for chest pain, palpitations and leg swelling. Gastrointestinal:  Negative for abdominal pain, constipation, diarrhea, nausea and vomiting. Genitourinary:  Negative for dysuria and hematuria. Musculoskeletal:  Positive for joint swelling. Negative for gait problem. Skin:  Positive for color change and wound. Negative for rash. Neurological:  Negative for dizziness, syncope, light-headedness and numbness. Psychiatric/Behavioral:  Negative for dysphoric mood. The patient is not nervous/anxious. Objective:    No intake/output data recorded.   No intake/output data recorded. Patient Vitals for the past 8 hrs:   BP Temp Pulse Resp SpO2 Height Weight   08/15/22 0015 (!) 156/97 99.5 °F (37.5 °C) (!) 103 19 98 % -- --   08/14/22 2230 (!) 135/96 -- 89 14 96 % -- --   08/14/22 2220 134/76 99.1 °F (37.3 °C) 93 18 97 % 5' 4\" (1.626 m) 65.8 kg (145 lb)     Physical Exam  Constitutional:       General: She is not in acute distress. Appearance: Normal appearance. She is normal weight. She is not ill-appearing, toxic-appearing or diaphoretic. HENT:      Head: Normocephalic and atraumatic. Mouth/Throat:      Mouth: Mucous membranes are moist.      Pharynx: Oropharynx is clear. Eyes:      Extraocular Movements: Extraocular movements intact. Pupils: Pupils are equal, round, and reactive to light. Cardiovascular:      Rate and Rhythm: Normal rate and regular rhythm. Pulses: Normal pulses. Heart sounds: No murmur heard. Pulmonary:      Effort: Pulmonary effort is normal. No respiratory distress. Breath sounds: Normal breath sounds. No wheezing. Abdominal:      General: Abdomen is flat. Bowel sounds are normal. There is no distension. Palpations: Abdomen is soft. Tenderness: There is no abdominal tenderness. Musculoskeletal:         General: Swelling and tenderness present. Cervical back: Normal range of motion and neck supple. Right lower leg: Edema present. Left lower leg: No edema. Skin:     General: Skin is warm and dry. Comments: Right forearm wound as seen by picture in the emergency department note    Swelling and redness to right knee   Neurological:      General: No focal deficit present. Mental Status: She is alert and oriented to person, place, and time. Mental status is at baseline.    Psychiatric:         Mood and Affect: Mood normal.         Behavior: Behavior normal.        Labs:    Recent Results (from the past 24 hour(s))   URINALYSIS W/ REFLEX CULTURE    Collection Time: 08/14/22  5:28 AM    Specimen: Urine   Result Value Ref Range    Color YELLOW/STRAW      Appearance CLOUDY (A) CLEAR      Specific gravity 1.020      pH (UA) 7.0 5.0 - 8.0      Protein TRACE (A) NEG mg/dL    Glucose Negative NEG mg/dL    Ketone Negative NEG mg/dL    Bilirubin Negative NEG      Blood Negative NEG      Urobilinogen 1.0 0.2 - 1.0 EU/dL    Nitrites Positive (A) NEG      Leukocyte Esterase MODERATE (A) NEG      WBC 20-50 0 - 4 /hpf    RBC 0-5 0 - 5 /hpf    Epithelial cells MODERATE (A) FEW /lpf    Bacteria 4+ (A) NEG /hpf    UA:UC IF INDICATED URINE CULTURE ORDERED (A) CNI           Assessment:  Principal Problem:    Septic arthritis (HCC) (8/14/2022)    Active Problems:    Right forearm cellulitis (8/15/2022)      Hypokalemia (8/15/2022)      Anemia (8/15/2022)      Plan:    59-year-old female with a past medical history significant for a prior right knee surgery who presents to the emergency department complaining of right knee swelling as well as a chronic right arm wound. There is concern of septic arthritis seen on CT scan, patient declined to have the knee aspirated by anyone other than orthopedic surgeon. She will be admitted to the hospital service given daptomycin and cefepime and orthopedic surgery will be consulted. Septic arthritis ([address]  Concern of septic arthritis in right knee. Patient requested that an orthopedic surgery drain the right knee and not an ER physician so she was transferred from [facility] to [facility]. Of note, she had a previous surgery done on her right knee  -Daptomycin and cefepime  -CBC  -ESR and CRP  -Joint fluid studies and cultures  -Orthopedic surgery consultation    Right forearm cellulitis  Concern for right arm cellulitis  -Daptomycin and cefepime  -Wound care consultation    Hypokalemia  Potassium of 2.9 in the [address] [place] [address] emergency department. Unsure if repleted.   -Repeat BMP    Anemia  Hemoglobin of 8.5 in the emergency department with microcytosis  -Recheck CBC  -Iron studies     SIV  Lytes as above  NPO pending orthopedic surgery evaluation    Full Code    This patient was discussed with the emergency department physician.     Signed:  Lynne Bar MD 8/15/2022

## 2022-08-15 NOTE — ED NOTES
Bedside shift change report given to Perico Santoyo (oncoming nurse) by Sumaya Mccarthy (offgoing nurse). Report included the following information SBAR, ED Summary, Intake/Output, MAR and Recent Results.

## 2022-08-15 NOTE — ASSESSMENT & PLAN NOTE
Concern of septic arthritis in right knee. Patient requested that an orthopedic surgery drain the right knee and not an ER physician so she was transferred from [facility] to [facility].   Of note, she had a previous surgery done on her right knee  -Daptomycin and cefepime  -CBC  -ESR and CRP  -Joint fluid studies and cultures  -Orthopedic surgery consultation

## 2022-08-15 NOTE — CONSULTS
ORTHO  CONSULT    Subjective:     Date of Consultation:  August 15, 2022    Referring Physician:  ER    72-year-old female with a past medical history significant for a prior right knee surgery who presents to the emergency department complaining of right knee swelling as well as a chronic right arm wound. She states that around 1 month ago, she fell twisting her right knee and scraping her right forearm. She reports that since this time, she has been having worsening of her right forearm wound as well as worsening swelling and redness of her right knee. It has gotten particularly bad in the last couple of days prompting an emergency room visit. She states that she has been having difficulty ambulating secondary to her right knee pain and swelling. Otherwise, she denies any fever/chills, headache, dizziness, chest pain, palpitations, cough, shortness of breath, abdominal pain or nausea/vomiting. Patient Active Problem List    Diagnosis Date Noted    Right forearm cellulitis 08/15/2022    Hypokalemia 08/15/2022    Anemia 08/15/2022    Septic arthritis ([facility] [address] 08/14/2022     Family History   Problem Relation Age of Onset    Hypertension Father       Social History     Tobacco Use    Smoking status: Never    Smokeless tobacco: Never   Substance Use Topics    Alcohol use: No     Past Medical History:   Diagnosis Date    IV drug abuse ([facility] [address]       Past Surgical History:   Procedure Laterality Date    HX ORTHOPAEDIC      R knee      Prior to Admission medications    Medication Sig Start Date End Date Taking? Authorizing Provider   clindamycin (CLEOCIN) 300 mg capsule Take 1 Capsule by mouth four (4) times daily for 7 days. 8/13/22 8/20/22  Karla Marrero MD   HYDROcodone-acetaminophen [facility]) 5-325 mg per tablet Take 1 tablet by mouth every eight (8) hours as needed for Pain for up to 8 doses.  11/25/14   Adrianna Lainez MD   promethazine (PHENERGAN) 25 mg suppository Insert 1 suppository into rectum every six (6) hours as needed for Nausea for up to 7 doses. 11/25/14   Alexis Corrales MD     Current Facility-Administered Medications   Medication Dose Route Frequency    DAPTOmycin (CUBICIN) 400 mg in 0.9% sodium chloride 50 mL IVPB  6 mg/kg IntraVENous Q24H    HYDROcodone-acetaminophen (NORCO) 5-325 mg per tablet 1 Tablet  1 Tablet Oral Q8H PRN    sodium chloride (NS) flush 5-40 mL  5-40 mL IntraVENous Q8H    sodium chloride (NS) flush 5-40 mL  5-40 mL IntraVENous PRN    acetaminophen (TYLENOL) tablet 650 mg  650 mg Oral Q6H PRN    Or    acetaminophen (TYLENOL) suppository 650 mg  650 mg Rectal Q6H PRN    polyethylene glycol (MIRALAX) packet 17 g  17 g Oral DAILY PRN    alum-mag hydroxide-simeth (MYLANTA) oral suspension 15 mL  15 mL Oral Q6H PRN    promethazine (PHENERGAN) tablet 12.5 mg  12.5 mg Oral Q6H PRN    Or    ondansetron (ZOFRAN) injection 4 mg  4 mg IntraVENous Q6H PRN    cefepime (MAXIPIME) 2 g in 0.9% sodium chloride (MBP/ADV) 100 mL MBP  2 g IntraVENous Q8H     Current Outpatient Medications   Medication Sig    clindamycin (CLEOCIN) 300 mg capsule Take 1 Capsule by mouth four (4) times daily for 7 days. HYDROcodone-acetaminophen (NORCO) 5-325 mg per tablet Take 1 tablet by mouth every eight (8) hours as needed for Pain for up to 8 doses. promethazine (PHENERGAN) 25 mg suppository Insert 1 suppository into rectum every six (6) hours as needed for Nausea for up to 7 doses. Allergies   Allergen Reactions    Zofran [Ondansetron Hcl (Pf)] Anaphylaxis    Amoxicillin Unknown (comments)    Bactrim [Sulfamethoprim Ds] Unknown (comments)    Darvocet A500 [Propoxyphene N-Acetaminophen] Rash    Doxycycline Unknown (comments)    Penicillin V Unknown (comments)    Ultram [Tramadol] Unknown (comments)    Vancomycin Itching     Pt reports itching and lip swelling        Review of Systems:  Pertinent items are noted in HPI.     Objective:     Patient Vitals for the past 8 hrs:   BP Temp Pulse Resp SpO2   08/15/22 0645 128/83 -- 90 12 98 %   08/15/22 0505 133/83 -- 92 18 98 %   08/15/22 0355 131/83 99.5 °F (37.5 °C) 91 14 97 %   08/15/22 0245 135/82 -- 100 17 98 %   08/15/22 0100 (!) 150/88 -- 93 21 98 %   08/15/22 0015 (!) 156/97 99.5 °F (37.5 °C) (!) 103 19 98 %     Temp (24hrs), Av °F (37.2 °C), Min:98.1 °F (36.7 °C), Max:99.5 °F (37.5 °C)        General exam: Patient is awake, alert, and oriented x3. Well-appearing. No acute distress. Right knee: Neurovascular and sensory intact. Effusion is present. Crepitus is noted with range of motion. There is tenderness palpation at the medial and lateral joint line. Betzaida's maneuver creates mild pain. Normal stability on ligamentous testing including Lachman's exam.  Stable anterior and posterior drawer. No erythema or ecchymosis. EXAM: CT LOW EXT RT WO CONT   INDICATION: Right knee swelling and clinical diagnosis of infection. Normal  white blood cell count. Evaluate for abscess. COMPARISON: Right knee views on 2013   CONTRAST: None. FINDINGS: Bones: Bones are osteopenic. No acute fracture. There are erosions of  the subchondral distal femur and proximal tibia. Minimal erosions in the patella. Joint fluid: Large knee joint effusion. Articulations: Tricompartmental joint space narrowing. Tricompartmental  erosions. No chondrocalcinosis. Tendons: Extensor mechanism is intact  Muscles: Moderate diffuse atrophy. Soft tissue mass: No mass. No drainable fluid collection in the extra-articular space. IMPRESSION  1. Inflammatory versus septic arthritis is subacute or chronic. 2. No fracture. 3. No drainable extra-articular abscess.       Data Review   Recent Results (from the past 24 hour(s))   SED RATE (ESR)    Collection Time: 08/15/22  1:04 AM   Result Value Ref Range    Sed rate, automated >140 (H) 0 - 20 mm/hr   C REACTIVE PROTEIN, QT    Collection Time: 08/15/22  1:04 AM   Result Value Ref Range    C-Reactive protein 11.90 (H) 0.00 - 0.60 mg/dL METABOLIC PANEL, COMPREHENSIVE    Collection Time: 08/15/22  1:04 AM   Result Value Ref Range    Sodium 136 136 - 145 mmol/L    Potassium 3.3 (L) 3.5 - 5.1 mmol/L    Chloride 104 97 - 108 mmol/L    CO2 25 21 - 32 mmol/L    Anion gap 7 5 - 15 mmol/L    Glucose 80 65 - 100 mg/dL    BUN 7 6 - 20 MG/DL    Creatinine 0.68 0.55 - 1.02 MG/DL    BUN/Creatinine ratio 10 (L) 12 - 20      GFR est AA >60 >60 ml/min/1.73m2    GFR est non-AA >60 >60 ml/min/1.73m2    Calcium 9.2 8.5 - 10.1 MG/DL    Bilirubin, total 0.4 0.2 - 1.0 MG/DL    ALT (SGPT) 8 (L) 12 - 78 U/L    AST (SGOT) 24 15 - 37 U/L    Alk. phosphatase 76 45 - 117 U/L    Protein, total 9.0 (H) 6.4 - 8.2 g/dL    Albumin 1.5 (L) 3.5 - 5.0 g/dL    Globulin 7.5 (H) 2.0 - 4.0 g/dL    A-G Ratio 0.2 (L) 1.1 - 2.2     CBC WITH AUTOMATED DIFF    Collection Time: 08/15/22  1:04 AM   Result Value Ref Range    WBC 8.1 3.6 - 11.0 K/uL    RBC 4.04 3.80 - 5.20 M/uL    HGB 8.6 (L) 11.5 - 16.0 g/dL    HCT 28.8 (L) 35.0 - 47.0 %    MCV 71.3 (L) 80.0 - 99.0 FL    MCH 21.3 (L) 26.0 - 34.0 PG    MCHC 29.9 (L) 30.0 - 36.5 g/dL    RDW 18.1 (H) 11.5 - 14.5 %    PLATELET 268 (H) 436 - 400 K/uL    MPV 8.4 (L) 8.9 - 12.9 FL    NRBC 0.0 0  WBC    ABSOLUTE NRBC 0.00 0.00 - 0.01 K/uL    NEUTROPHILS 77 (H) 32 - 75 %    LYMPHOCYTES 12 12 - 49 %    MONOCYTES 3 (L) 5 - 13 %    EOSINOPHILS 8 (H) 0 - 7 %    BASOPHILS 0 0 - 1 %    IMMATURE GRANULOCYTES 0 0.0 - 0.5 %    ABS. NEUTROPHILS 6.2 1.8 - 8.0 K/UL    ABS. LYMPHOCYTES 1.0 0.8 - 3.5 K/UL    ABS. MONOCYTES 0.2 0.0 - 1.0 K/UL    ABS. EOSINOPHILS 0.6 (H) 0.0 - 0.4 K/UL    ABS. BASOPHILS 0.0 0.0 - 0.1 K/UL    ABS. IMM.  GRANS. 0.0 0.00 - 0.04 K/UL    DF AUTOMATED     POC LACTIC ACID    Collection Time: 08/15/22  1:12 AM   Result Value Ref Range    Lactic Acid (POC) 0.95 0.40 - 6.38 mmol/L   METABOLIC PANEL, COMPREHENSIVE    Collection Time: 08/15/22  3:49 AM   Result Value Ref Range    Sodium 138 136 - 145 mmol/L    Potassium 4.0 3.5 - 5.1 mmol/L Chloride 105 97 - 108 mmol/L    CO2 25 21 - 32 mmol/L    Anion gap 8 5 - 15 mmol/L    Glucose 78 65 - 100 mg/dL    BUN 8 6 - 20 MG/DL    Creatinine 0.63 0.55 - 1.02 MG/DL    BUN/Creatinine ratio 13 12 - 20      GFR est AA >60 >60 ml/min/1.73m2    GFR est non-AA >60 >60 ml/min/1.73m2    Calcium 8.8 8.5 - 10.1 MG/DL    Bilirubin, total 0.5 0.2 - 1.0 MG/DL    ALT (SGPT) 8 (L) 12 - 78 U/L    AST (SGOT) 24 15 - 37 U/L    Alk. phosphatase 70 45 - 117 U/L    Protein, total 8.5 (H) 6.4 - 8.2 g/dL    Albumin 1.3 (L) 3.5 - 5.0 g/dL    Globulin 7.2 (H) 2.0 - 4.0 g/dL    A-G Ratio 0.2 (L) 1.1 - 2.2     CBC WITH AUTOMATED DIFF    Collection Time: 08/15/22  3:49 AM   Result Value Ref Range    WBC 8.2 3.6 - 11.0 K/uL    RBC 3.71 (L) 3.80 - 5.20 M/uL    HGB 7.9 (L) 11.5 - 16.0 g/dL    HCT 26.4 (L) 35.0 - 47.0 %    MCV 71.2 (L) 80.0 - 99.0 FL    MCH 21.3 (L) 26.0 - 34.0 PG    MCHC 29.9 (L) 30.0 - 36.5 g/dL    RDW 18.1 (H) 11.5 - 14.5 %    PLATELET 991 (H) 619 - 400 K/uL    MPV 8.8 (L) 8.9 - 12.9 FL    NRBC 0.0 0  WBC    ABSOLUTE NRBC 0.00 0.00 - 0.01 K/uL    NEUTROPHILS 72 32 - 75 %    LYMPHOCYTES 16 12 - 49 %    MONOCYTES 5 5 - 13 %    EOSINOPHILS 7 0 - 7 %    BASOPHILS 0 0 - 1 %    IMMATURE GRANULOCYTES 0 0.0 - 0.5 %    ABS. NEUTROPHILS 5.9 1.8 - 8.0 K/UL    ABS. LYMPHOCYTES 1.3 0.8 - 3.5 K/UL    ABS. MONOCYTES 0.4 0.0 - 1.0 K/UL    ABS. EOSINOPHILS 0.6 (H) 0.0 - 0.4 K/UL    ABS. BASOPHILS 0.0 0.0 - 0.1 K/UL    ABS. IMM. GRANS. 0.0 0.00 - 0.04 K/UL    DF AUTOMATED     IRON PROFILE    Collection Time: 08/15/22  3:49 AM   Result Value Ref Range    Iron 31 (L) 35 - 150 ug/dL    TIBC 152 (L) 250 - 450 ug/dL    Iron % saturation 20 20 - 50 %         Assessment/Plan:     Right knee osteoarthritis    Findings were discussed with the patient today. There was a negative aspiration performed earlier. This indicates that knee effusion is likely hematoma related to advanced osteoarthritis.   Based on labs I have low concern at this point for septic joint. Consider anti-inflammatories or Medrol Dosepak to decrease knee swelling and pain.       Mari Chaparro, DO

## 2022-08-15 NOTE — CONSULTS
Ortho:    Pt states she presented to [facility] ED because she hasn't been able to walk on her right leg for over a month- using a walker/cane to get around  States she had previous surgery([facility]) on her right knee \"because I had fluid on it\"  Denies F/C/Flu like symptoms     Transferred to ED [facility] due to concern for septic right knee    EXAM: CT LOW EXT RT WO CONT   INDICATION: Right knee swelling and clinical diagnosis of infection. Normal  white blood cell count. Evaluate for abscess. COMPARISON: Right knee views on 1/4/2013   CONTRAST: None. FINDINGS: Bones: Bones are osteopenic. No acute fracture. There are erosions of  the subchondral distal femur and proximal tibia. Minimal erosions in the patella. Joint fluid: Large knee joint effusion. Articulations: Tricompartmental joint space narrowing. Tricompartmental  erosions. No chondrocalcinosis. Tendons: Extensor mechanism is intact  Muscles: Moderate diffuse atrophy. Soft tissue mass: No mass. No drainable fluid collection in the extra-articular space. IMPRESSION  1. Inflammatory versus septic arthritis is subacute or chronic. 2. No fracture. 3. No drainable extra-articular abscess. CBC 10.6 this afternoon  Temp 99.1  Right knee held flexed 35degrees. Resistant to any knee A/P ROM  Well healed surgical scar anterosuperior aspect  No sign of LE DVT  No obvious cellulitis of the anterior knee  No noticible temp difference R v/s L    Ortho:  Right knee joint aspiration and injection; procedure described to pt, risk and benefits reviewed. Consent signed by the patient. Skin scrubbed with numerus alcohol wipes, Under sterile condition 3cc lidocaine injected into sub-q tissues right knee, via superior lateral patellar approach  Joint aspirated with 18g on a 60cc syringe.    2nd attempt via anteromedial joint line  Both attempts were unsuccessful -  pt was extremely anxious and begged for the needle to be removed from the joint  Vitals stable before and after procedure- Inflammatory markers ordered  Will follow   No urgent surgical intervention    Plan per SAMANTHA MathisC

## 2022-08-15 NOTE — PROGRESS NOTES
0730:  Bedside shift change report given to Inessa Rodriguez (oncoming nurse) by Jamie Avila (offgoing nurse). Report included the following information SBAR, Kardex, MAR, and Cardiac Rhythm NSR .      1020:  Patient requesting to be restarted on methadone. Unsure what clinic she uses or dose she had taken. MD aware. Pharmacy aware & working on verification. [phone]:  Patient refused all antibiotics this afternoon    1554:  TRANSFER - OUT REPORT:    Verbal report given to Lance Beavers (name) on [facility]  being transferred to Neuro Tele (unit) for routine progression of care       Report consisted of patients Situation, Background, Assessment and   Recommendations(SBAR). Information from the following report(s) SBAR, Kardex, MAR, and Cardiac Rhythm Nsr  was reviewed with the receiving nurse. Lines:   Peripheral IV Left Basilic (Active)       Peripheral IV Right Basilic (Active)        Opportunity for questions and clarification was provided.       Patient transported with:

## 2022-08-16 PROCEDURE — 97166 OT EVAL MOD COMPLEX 45 MIN: CPT

## 2022-08-16 PROCEDURE — 74011250637 HC RX REV CODE- 250/637: Performed by: INTERNAL MEDICINE

## 2022-08-16 PROCEDURE — 97530 THERAPEUTIC ACTIVITIES: CPT

## 2022-08-16 PROCEDURE — 65270000046 HC RM TELEMETRY

## 2022-08-16 PROCEDURE — 74011000250 HC RX REV CODE- 250: Performed by: HOSPITALIST

## 2022-08-16 PROCEDURE — 97162 PT EVAL MOD COMPLEX 30 MIN: CPT

## 2022-08-16 PROCEDURE — 74011000258 HC RX REV CODE- 258: Performed by: HOSPITALIST

## 2022-08-16 PROCEDURE — 74011250636 HC RX REV CODE- 250/636: Performed by: HOSPITALIST

## 2022-08-16 RX ADMIN — DAPTOMYCIN 400 MG: 500 INJECTION, POWDER, LYOPHILIZED, FOR SOLUTION INTRAVENOUS at 13:29

## 2022-08-16 RX ADMIN — CEFEPIME 2 G: 2 INJECTION, POWDER, FOR SOLUTION INTRAVENOUS at 08:23

## 2022-08-16 RX ADMIN — METHADONE HYDROCHLORIDE 15 MG: 10 CONCENTRATE ORAL at 08:22

## 2022-08-16 RX ADMIN — SODIUM CHLORIDE, PRESERVATIVE FREE 10 ML: 5 INJECTION INTRAVENOUS at 05:59

## 2022-08-16 NOTE — PROGRESS NOTES
Hospitalist Progress Note    NAME: Emelia Jarvis   :  1975   MRN:  [id number]       Assessment / Plan:  Right knee swelling and pain  History of right knee shots  -Arthrocentesis was attempted but was not successful  -Patient was seen by orthopedics and felt that this is most likely hematoma due to right knee osteoarthritis and felt that there is low concern for infection versus inflammation  -Pain control.  -Monitor for any fever.  -Orthopedics is following  -Consult PT OT if okay with orthopedics    Right forearm cellulitis  -We will continue daptomycin and cefepime. Keep right forearm elevated. Pain control. Wound care evaluation pending    Hypokalemia  Anemia of chronic disease  -Potassium is normal  -Hemoglobin is 7.9. Baseline appears to be around 8-9. Check CBC in a.m. Abnormal UA  -UA positive for nitrates and leukocyte esterase but not a clean-catch. Follow urine culture results.  -Urine culture grew mixed kiran  -On empiric cefepime as above    History of heroin use  -Per pharmacy, patient is not activated any methadone clinics  -We will give methadone for 3 doses per protocol    ELIAN:  if okay with orthopedics and clinically better  Barriers: Clinical improvement           Subjective:     Chief Complaint / Reason for Physician Visit  Right knee pain is slightly better. Swelling is also better  Right forearm pain is better as well  No fever  Overnight events noted and discussed with nursing        Review of Systems:  Symptom Y/N Comments  Symptom Y/N Comments   Fever/Chills    Chest Pain     Poor Appetite    Edema     Cough    Abdominal Pain     Sputum    Joint Pain     SOB/CHAMBERLAIN    Pruritis/Rash     Nausea/vomit    Tolerating PT/OT     Diarrhea    Tolerating Diet     Constipation    Other       Could NOT obtain due to:      Objective:     VITALS:   Last 24hrs VS reviewed since prior progress note.  Most recent are:  Patient Vitals for the past 24 hrs:   Temp Pulse Resp BP SpO2 08/16/22 0844 98 °F (36.7 °C) 78 18 138/82 100 %   08/15/22 2200 98.9 °F (37.2 °C) 81 15 (!) 152/88 --       No intake or output data in the 24 hours ending 08/16/22 1526       I had a face to face encounter and independently examined this patient on 8/16/2022, as outlined below:    PHYSICAL EXAM:  General: Alert, cooperative, no acute distress    EENT:  EOMI. Anicteric sclerae. MMM  Resp:  CTA bilaterally, no wheezing or rales. No accessory muscle use  CV:  Regular  rhythm,  No edema  GI:  Soft, Non distended, Non tender. +Bowel sounds  Neurologic:  Alert and oriented X 3, normal speech, right knee joint tenderness present  Psych:   Not anxious nor agitated  Skin:  No rashes. No jaundice    Reviewed most current lab test results and cultures  YES  Reviewed most current radiology test results   YES  Review and summation of old records today    NO  Reviewed patient's current orders and MAR    YES  PMH/ reviewed - no change compared to H&P  ________________________________________________________________________  Care Plan discussed with:    Comments   Patient y    Family      RN y    Care Manager     Consultant                        Multidiciplinary team rounds were held today with , nursing, pharmacist and clinical coordinator. Patient's plan of care was discussed; medications were reviewed and discharge planning was addressed. ________________________________________________________________________  Total NON critical care TIME:  35    Minutes    Total CRITICAL CARE TIME Spent:   Minutes non procedure based      Comments   >50% of visit spent in counseling and coordination of care     ________________________________________________________________________  Orestes Castaneda MD     Procedures: see electronic medical records for all procedures/Xrays and details which were not copied into this note but were reviewed prior to creation of Plan.       LABS:  I reviewed today's most current labs and imaging studies.   Pertinent labs include:  Recent Labs     08/15/22  0349 08/15/22  0104 08/13/22  1617   WBC 8.2 8.1 10.6   HGB 7.9* 8.6* 8.5*   HCT 26.4* 28.8* 28.2*   * 458* 516*       Recent Labs     08/15/22  0349 08/15/22  0104 08/13/22  1617    136 137   K 4.0 3.3* 2.9*    104 99   CO2 25 25 31   GLU 78 80 83   BUN 8 7 8   CREA 0.63 0.68 0.79   CA 8.8 9.2 9.2   ALB 1.3* 1.5* 1.5*   TBILI 0.5 0.4 0.3   ALT 8* 8* 9*         Signed: Millie Winston MD

## 2022-08-16 NOTE — PROGRESS NOTES
Problem: Falls - Risk of  Goal: *Absence of Falls  Description: Document Franco Blanca Fall Risk and appropriate interventions in the flowsheet. Outcome: Progressing Towards Goal  Note: Fall Risk Interventions:            Medication Interventions: Bed/chair exit alarm    Elimination Interventions: Call light in reach              Problem: Patient Education: Go to Patient Education Activity  Goal: Patient/Family Education  Outcome: Progressing Towards Goal     Problem: Pressure Injury - Risk of  Goal: *Prevention of pressure injury  Description: Document Darío Scale and appropriate interventions in the flowsheet.   Outcome: Progressing Towards Goal  Note: Pressure Injury Interventions:  Sensory Interventions: Assess changes in LOC    Moisture Interventions: Absorbent underpads    Activity Interventions: Assess need for specialty bed    Mobility Interventions: Assess need for specialty bed    Nutrition Interventions: Document food/fluid/supplement intake    Friction and Shear Interventions: Apply protective barrier, creams and emollients                Problem: Patient Education: Go to Patient Education Activity  Goal: Patient/Family Education  Outcome: Progressing Towards Goal

## 2022-08-16 NOTE — PROGRESS NOTES
Problem: Mobility Impaired (Adult and Pediatric)  Goal: *Acute Goals and Plan of Care (Insert Text)  Description: FUNCTIONAL STATUS PRIOR TO ADMISSION: Patient was modified independent using a rolling walker and single point cane for functional mobility. HOME SUPPORT PRIOR TO ADMISSION: The patient lived with sister. Physical Therapy Goals  Initiated 8/16/2022  1. Patient will move from supine to sit and sit to supine  in bed with minimal assistance/contact guard assist within 7 day(s). 2.  Patient will transfer from bed to chair and chair to bed with minimal assistance/contact guard assist using the least restrictive device within 7 day(s). 3.  Patient will perform sit to stand with minimal assistance/contact guard assist within 7 day(s). 4.  Patient will ambulate with minimal assistance/contact guard assist for 2 feet with the least restrictive device within 7 day(s). Outcome: Not Met     PHYSICAL THERAPY EVALUATION  Patient: Emelia Jarvis (41 y.o. female)  Date: 8/16/2022  Primary Diagnosis: [facility] ([facility] [M00.9]       Precautions:   Fall, Bed Alarm      ASSESSMENT  Based on the objective data described below, the patient presents with increased pain, generalized weakness, decreased activity tolerance, self limiting behavior and overall poor functional mobility. Pt was received in supine on room air and cleared by nursing to mobilize. She needed a significant amount of time to perform all mobility. Came to the EOB. Unable to stand due to increased pain, but was able to squat pivot to the chair with min A. She was left sitting up in the chair at the end of the session. Current Level of Function Impacting Discharge (mobility/balance): min A x 2    Functional Outcome Measure: The patient scored Total: 45/100 on the Barthel Index outcome measure which is indicative of being partially dependent in basic self-care.      Other factors to consider for discharge:      Patient will benefit from skilled therapy intervention to address the above noted impairments. PLAN :  Recommendations and Planned Interventions: bed mobility training, transfer training, gait training, therapeutic exercises, patient and family training/education, and therapeutic activities      Frequency/Duration: Patient will be followed by physical therapy:  3 times a week to address goals. Recommendation for discharge: (in order for the patient to meet his/her long term goals)  Therapy up to 5 days/week in SNF setting    This discharge recommendation:  Has not yet been discussed the attending provider and/or case management    IF patient discharges home will need the following DME: to be determined (TBD)         SUBJECTIVE:   Patient stated I can't walk! Toñito Mace    OBJECTIVE DATA SUMMARY:   HISTORY:    Past Medical History:   Diagnosis Date    IV drug abuse ([facility]      Past Surgical History:   Procedure Laterality Date    HX ORTHOPAEDIC      R knee       Personal factors and/or comorbidities impacting plan of care:     Home Situation  Home Environment: Apartment  One/Two Story Residence: One story  Living Alone: Yes  Support Systems: Other Family Member(s)  Patient Expects to be Discharged to[de-identified] Home  Current DME Used/Available at Home: None    EXAMINATION/PRESENTATION/DECISION MAKING:   Critical Behavior:  Neurologic State: Alert  Orientation Level: Oriented X4  Cognition: Decreased command following, Impaired decision making, Poor safety awareness  Safety/Judgement: Awareness of environment, Decreased insight into deficits, Fall prevention  Hearing:   Auditory  Auditory Impairment: None  Skin:  bandages intact  Edema: WDL  Range Of Motion:  AROM: Generally decreased, functional                       Strength:    Strength: Generally decreased, functional                    Tone & Sensation:   Tone: Normal              Sensation: Impaired (BLE numbness)               Coordination:  Coordination: Generally decreased, functional  Vision:   Acuity: Within Defined Limits  Corrective Lenses: Glasses  Functional Mobility:  Bed Mobility:  Rolling: Contact guard assistance; Additional time  Supine to Sit: Contact guard assistance; Additional time     Scooting: Contact guard assistance; Additional time  Transfers:  Sit to Stand: Minimum assistance  Stand to Sit: Minimum assistance        Bed to Chair: Minimum assistance              Balance:   Sitting: Impaired  Sitting - Static: Good (unsupported)  Sitting - Dynamic: Fair (occasional)  Standing: Impaired; With support (HHA)  Standing - Static: Fair;Constant support  Standing - Dynamic : Poor;Constant support  Ambulation/Gait Training:      Refused to attempt due to pain                     Functional Measure:  Barthel Index:    Bathin  Bladder: 10  Bowels: 10  Groomin  Dressin  Feeding: 10  Mobility: 0  Stairs: 0  Toilet Use: 5  Transfer (Bed to Chair and Back): 5  Total: 45/100       The Barthel ADL Index: Guidelines  1. The index should be used as a record of what a patient does, not as a record of what a patient could do. 2. The main aim is to establish degree of independence from any help, physical or verbal, however minor and for whatever reason. 3. The need for supervision renders the patient not independent. 4. A patient's performance should be established using the best available evidence. Asking the patient, friends/relatives and nurses are the usual sources, but direct observation and common sense are also important. However direct testing is not needed. 5. Usually the patient's performance over the preceding 24-48 hours is important, but occasionally longer periods will be relevant. 6. Middle categories imply that the patient supplies over 50 per cent of the effort. 7. Use of aids to be independent is allowed.     Score Interpretation (from [address] [place] [address])    Independent   60-79 Minimally independent   40-59 Partially dependent   20-39 Very dependent   <20 Totally dependent     -Kirstin Robins, Barthel, D.W. (6419). Functional evaluation: the Barthel Index. [address] ([address] (1997). The Barthel activities of daily living index: self-reporting versus actual performance in the old (> or = 75 years). Journal of [address] [place] [address] 45(7), 14 [facility], GARRET, Aimee Haile., Mabel Felty. (1999). Measuring the change in disability after inpatient rehabilitation; comparison of the responsiveness of the Barthel Index and Functional [place] Measure. Journal of Neurology, Neurosurgery, and Psychiatry, 66(4), 258-457. Silvana Matias, N.J.A, COREY Alejandre, & Edwin Stewart MNGOZI. (2004) Assessment of post-stroke quality of life in cost-effectiveness studies: The usefulness of the Barthel Index and the EuroQoL-5D. Quality of Life Research, 15, 824-69        Physical Therapy Evaluation Charge Determination   History Examination Presentation Decision-Making   HIGH Complexity :3+ comorbidities / personal factors will impact the outcome/ POC  MEDIUM Complexity : 3 Standardized tests and measures addressing body structure, function, activity limitation and / or participation in recreation  MEDIUM Complexity : Evolving with changing characteristics  Other outcome measures barthel  MEDIUM      Based on the above components, the patient evaluation is determined to be of the following complexity level: MEDIUM    Pain Rating:  10/10    Activity Tolerance:   Poor    After treatment patient left in no apparent distress:   Sitting in chair and Call bell within reach    COMMUNICATION/EDUCATION:   The patients plan of care was discussed with: Occupational therapist and Registered nurse. Fall prevention education was provided and the patient/caregiver indicated understanding. and Patient understands intent and goals of therapy, but is neutral about his/her participation.     Thank you for this referral.  Jason Bland So, PT, DPT   Time Calculation: 23 mins

## 2022-08-16 NOTE — WOUND CARE
Wound care Nurse consult: consult placed for POA right forearm wound. Patient is a 56 y/o AAF admitted for septic arthritis of knee. Past Medical History:   Diagnosis Date    IV drug abuse [facility])      Patient on Methadone. Asked patient for history of right arm wound and no explanation besides she has ad it a long time. When asked how she takes care of it at home she states \" It does not bother me, I just leave it alone. There is some scaring adjacent to open wound that is well healed:    WOUND POA CONDITIONS    Wound Arm Right (Active)   Wound Image   08/16/22 1104   Dressing Status New dressing applied 08/16/22 1104   Cleansed Cleansed with saline 08/16/22 1104   Dressing/Treatment Honey gel/honey paste; Honey alginate 08/16/22 1104   Dressing Change Due 08/18/22 08/16/22 1104   Wound Length (cm) 7.5 cm 08/16/22 1104   Wound Width (cm) 5.3 cm 08/16/22 1104   Wound Depth (cm) 0.3 cm 08/16/22 1104   Wound Surface Area (cm^2) 39.75 cm^2 08/16/22 1104   Wound Volume (cm^3) 11.925 cm^3 08/16/22 1104   Wound Assessment Pink/red;Fibrinous;Eschar moist 08/16/22 1104   Drainage Amount Scant 08/16/22 1104   Drainage Description Yellow 08/16/22 1104   Wound Odor None 08/16/22 1104   Shanna-Wound/Incision Assessment Intact 08/16/22 1104   Edges Defined edges 08/16/22 1104   Wound Thickness Description Full thickness 08/16/22 1104   Number of days:        Recommend:    Right forearm wound on EOD/EVEN DAYS - cleanse with NS moist 4x4, wipe clean. Apply 1/2 tube of Therahoney onto wound and then cover wound with 1/2 sheet of Therahoney sheet, ABD pad and secure with keith and ace bandage/coban. Enough Therahoney gel and sheets in room to last for 7 more dressing changes.     Makeda Lisa RN, [employer]

## 2022-08-16 NOTE — PROGRESS NOTES
Bedside shift change report given to  Abad Sandy RN (oncoming nurse) by Hari Gray RN (offgoing nurse). Report included the following information     Shift worked:  days   Shift summary and any significant changes:      Admit to [facility]         Concerns for physician to address:     Zone phone for oncoming shift:        Patient Information  Oni Morton  55 y.o.  8/14/2022 10:16 PM by Hilda Landis MD. Oni Morton was admitted from ED     Problem List       Patient Active Problem List     Diagnosis Date Noted    Right forearm cellulitis 08/15/2022    Hypokalemia 08/15/2022    Anemia 08/15/2022    Septic arthritis ([facility] 08/14/2022           Past Medical History:   Diagnosis Date    IV drug abuse ([facility]           Core Measures: Activity:  Number times ambulated in hallways past shift:0  Cardiac:  Cardiac Monitoring: N        Access:   Current line(s):PIV  Genitourinary:   Urinary status: voiding  Urinary Catheter? P     Respiratory:      O2 Device: None  Chronic home O2 use?:  Incentive spirometer at bedside:     GI:           Pain Management:  Patient states pain is manageable on current regimen:     Skin:  Intervention    Patient Safety:  Fall Score:    Interventions:  @Rollbelt  @dexterity to release roll belt  Yes/No ( must document dexterity  here by stating Yes or No here, otherwise this is a restraint and must follow restraint documentation policy.)     DVT prophylaxis:  DVT prophylaxis Med-  DVT prophylaxis SCD or SHANICE     Wounds: (If Applicable)  Wounds-R arm , dressing D/I  Location     Active Consults:  IP CONSULT TO ORTHOPEDIC SURGERY     Length of Stay:  Expected LOS: - - -  Actual LOS: 1  Discharge Plan:

## 2022-08-16 NOTE — PROGRESS NOTES
Refused to have labs drawn Statted\"I don't want to be stuck\". Swatted at my hand when I attempted x 2.

## 2022-08-16 NOTE — PROGRESS NOTES
Problem: Self Care Deficits Care Plan (Adult)  Goal: *Acute Goals and Plan of Care (Insert Text)  Description: FUNCTIONAL STATUS PRIOR TO ADMISSION: Patient was modified independent using a rolling walker for functional mobility. Patient was modified independent for basic and instrumental ADLs. HOME SUPPORT: The patient lived with sister. Occupational Therapy Goals  Initiated 8/16/2022  1. Patient will perform grooming in standing with supervision/set-up within 7 day(s). 2.  Patient will perform lower body dressing with minimal assistance within 7 day(s). 3.  Patient will perform seated sponge bathing with minimal assistance within 7 day(s). 4.  Patient will perform toilet transfers with supervision/set-up within 7 day(s). 5.  Patient will perform all aspects of toileting with supervision/set-up within 7 day(s). Outcome: Not Met   OCCUPATIONAL THERAPY EVALUATION  Patient: Romy Campos (11 y.o. female)  Date: 8/16/2022  Primary Diagnosis: [facility]ic [facility] ([facility] [M00.9]       Precautions: Fall, Bed Alarm    ASSESSMENT  Based on the objective data described below, the patient presents with decreased independence in self-care and functional mobility secondary to general weakness, impaired balance, self-limiting behaviors, RLE pain, poor safety awareness, and poor activity tolerance. Patient is functioning below her baseline for self-care and functional mobility, now completing self-care with independence to total assist and functional mobility with contact guard to min assist requiring additional time. Patient received semisupine in bed and agreeable for therapy with max encouragement. Patient completed supine > sit with additional time and contact guard assist. While EOB, patient encouragement to attempt changing into hospital socks but she refused secondary to pain requiring total assist for task.  Patient initially resistive to attempting stand but when encouraged to return to supine she stated she wanted to transfer to recliner chair. Patient completed stand pivot transfer to chair with additional time and min assist using HHA x1. Patient was left sitting in recliner chair with all needs met and VSS. Patient would benefit from skilled OT services during acute hospital stay. Anticipate patient will need SNF rehab prior to returning home, pending progress. Current Level of Function Impacting Discharge (ADLs/self-care): independent to total assist for self-care, contact guard to min assist for functional mobility     Functional Outcome Measure: The patient scored 45/100 on the Barthel Index outcome measure which is indicative of being partially dependent in ADLs. Other factors to consider for discharge: fall risk, self-limiting, RLE pain      Patient will benefit from skilled therapy intervention to address the above noted impairments. PLAN :  Recommendations and Planned Interventions: self care training, functional mobility training, therapeutic exercise, balance training, therapeutic activities, endurance activities, patient education, home safety training, and family training/education    Frequency/Duration: Patient will be followed by occupational therapy 3 times a week to address goals. Recommendation for discharge: (in order for the patient to meet his/her long term goals)  Therapy up to 5 days/week in SNF setting    This discharge recommendation:  Has not yet been discussed the attending provider and/or case management    IF patient discharges home will need the following DME: TBD pending progress        SUBJECTIVE:   Patient stated I can't walk!     OBJECTIVE DATA SUMMARY:   HISTORY:   Past Medical History:   Diagnosis Date    IV drug abuse ([facility]      Past Surgical History:   Procedure Laterality Date    HX ORTHOPAEDIC      R knee       Expanded or extensive additional review of patient history:     Home Situation  Home Environment: Apartment  One/Two Story Residence: [facility] story  Living Alone: Yes  Support Systems: Other Family Member(s)  Patient Expects to be Discharged to[de-identified] Home  Current DME Used/Available at Home: None    Hand dominance: Right    EXAMINATION OF PERFORMANCE DEFICITS:  Cognitive/Behavioral Status:  Neurologic State: Alert  Orientation Level: Oriented X4  Cognition: Decreased command following; Impaired decision making;Poor safety awareness  Perception: Appears intact  Perseveration: Perseverates during conversation  Safety/Judgement: Awareness of environment;Decreased insight into deficits; Fall prevention    Skin: RLE wound    Edema: R knee edema    Hearing: Auditory  Auditory Impairment: None    Vision/Perceptual:    Acuity: Within Defined Limits    Corrective Lenses: Glasses    Range of Motion:  AROM: Generally decreased, functional    Strength:  Strength: Generally decreased, functional    Coordination:  Coordination: Generally decreased, functional  Fine Motor Skills-Upper: Left Intact; Right Intact    Gross Motor Skills-Upper: Left Intact; Right Intact    Tone & Sensation:  Tone: Normal  Sensation: Impaired (BLE numbness)    Balance:  Sitting: Impaired  Sitting - Static: Good (unsupported)  Sitting - Dynamic: Fair (occasional)  Standing: Impaired; With support (HHA)  Standing - Static: Fair;Constant support  Standing - Dynamic : Poor;Constant support    Functional Mobility and Transfers for ADLs:  Bed Mobility:  Rolling: Contact guard assistance; Additional time  Supine to Sit: Contact guard assistance; Additional time  Scooting: Contact guard assistance; Additional time    Transfers:  Sit to Stand: Minimum assistance  Stand to Sit: Minimum assistance  Bed to Chair: Minimum assistance    ADL Assessment:  Feeding: Independent  Oral Facial Hygiene/Grooming: Setup;Supervision (seated)  Bathing: Moderate assistance  Upper Body Dressing: Setup;Supervision  Lower Body Dressing:  Total assistance  Toileting: Maximum assistance    ADL Intervention and task modifications:    Lower Body Dressing Assistance  Socks: Total assistance (dependent)  Leg Crossed Method Used: No  Position Performed: Seated edge of bed  Cues: Doff;Don;Physical assistance; Tactile cues provided;Verbal cues provided    Cognitive Retraining  Safety/Judgement: Awareness of environment;Decreased insight into deficits; Fall prevention    Functional Measure:    Barthel Index:  Bathin  Bladder: 10  Bowels: 10  Groomin  Dressin  Feeding: 10  Mobility: 0  Stairs: 0  Toilet Use: 5  Transfer (Bed to Chair and Back): 5  Total: 45/100      The Barthel ADL Index: Guidelines  1. The index should be used as a record of what a patient does, not as a record of what a patient could do. 2. The main aim is to establish degree of independence from any help, physical or verbal, however minor and for whatever reason. 3. The need for supervision renders the patient not independent. 4. A patient's performance should be established using the best available evidence. Asking the patient, friends/relatives and nurses are the usual sources, but direct observation and common sense are also important. However direct testing is not needed. 5. Usually the patient's performance over the preceding 24-48 hours is important, but occasionally longer periods will be relevant. 6. Middle categories imply that the patient supplies over 50 per cent of the effort. 7. Use of aids to be independent is allowed. Score Interpretation (from [address] [place] [address])    Independent   60-79 Minimally independent   40-59 Partially dependent   20-39 Very dependent   <20 Totally dependent     -Ziggy Robins., Barthel, D.W. (1965). Functional evaluation: the Barthel Index. [address] [facility] ([address] (1997). The Barthel activities of daily living index: self-reporting versus actual performance in the old (> or = 75 years).  Journal of [address] [place] [address] 45(7), 14 [facility], JTOMAS, Rhonda Real, Emeli Man., [facility]. (1999). Measuring the change in disability after inpatient rehabilitation; comparison of the responsiveness of the Barthel Index and Functional [place] Measure. Journal of Neurology, Neurosurgery, and Psychiatry, 66(4), 245-346. PHUC Mcgee, COREY Alejandre, & Dolly Carmona M.A. (2004) Assessment of post-stroke quality of life in cost-effectiveness studies: The usefulness of the Barthel Index and the EuroQoL-5D. Quality of Life Research, 13, 583-86      Based on the above components, the patient evaluation is determined to be of the following complexity level: MEDIUM  Pain Rating:  Patient c/o RLE pain. Activity Tolerance:   Poor    After treatment patient left in no apparent distress:    Sitting in chair and Call bell within reach    COMMUNICATION/EDUCATION:   The patients plan of care was discussed with: Physical therapist and Registered nurse. Home safety education was provided and the patient/caregiver indicated understanding., Patient/family have participated as able in goal setting and plan of care. , and Patient/family agree to work toward stated goals and plan of care. This patients plan of care is appropriate for delegation to [facility].     Thank you for this referral.  Allan Sterling OTR/L  Time Calculation: 22 mins

## 2022-08-17 PROCEDURE — 97530 THERAPEUTIC ACTIVITIES: CPT | Performed by: PHYSICAL THERAPIST

## 2022-08-17 PROCEDURE — 74011250637 HC RX REV CODE- 250/637: Performed by: INTERNAL MEDICINE

## 2022-08-17 PROCEDURE — 87205 SMEAR GRAM STAIN: CPT

## 2022-08-17 PROCEDURE — 65270000046 HC RM TELEMETRY

## 2022-08-17 PROCEDURE — 99223 1ST HOSP IP/OBS HIGH 75: CPT | Performed by: INTERNAL MEDICINE

## 2022-08-17 PROCEDURE — 97535 SELF CARE MNGMENT TRAINING: CPT

## 2022-08-17 PROCEDURE — 87186 SC STD MICRODIL/AGAR DIL: CPT

## 2022-08-17 PROCEDURE — 87147 CULTURE TYPE IMMUNOLOGIC: CPT

## 2022-08-17 PROCEDURE — 87077 CULTURE AEROBIC IDENTIFY: CPT

## 2022-08-17 PROCEDURE — 97530 THERAPEUTIC ACTIVITIES: CPT

## 2022-08-17 RX ORDER — HEPARIN SODIUM 5000 [USP'U]/ML
5000 INJECTION, SOLUTION INTRAVENOUS; SUBCUTANEOUS EVERY 8 HOURS
Status: DISCONTINUED | OUTPATIENT
Start: 2022-08-17 | End: 2022-08-19 | Stop reason: HOSPADM

## 2022-08-17 RX ADMIN — METHADONE HYDROCHLORIDE 15 MG: 10 CONCENTRATE ORAL at 11:06

## 2022-08-17 NOTE — PROGRESS NOTES
Problem: Falls - Risk of  Goal: *Absence of Falls  Description: Document Laretta Jayson Fall Risk and appropriate interventions in the flowsheet. Outcome: Progressing Towards Goal  Note: Fall Risk Interventions:            Medication Interventions: Bed/chair exit alarm    Elimination Interventions: Call light in reach    History of Falls Interventions: Bed/chair exit alarm         Problem: Patient Education: Go to Patient Education Activity  Goal: Patient/Family Education  Outcome: Progressing Towards Goal     Problem: Pressure Injury - Risk of  Goal: *Prevention of pressure injury  Description: Document Darío Scale and appropriate interventions in the flowsheet. Outcome: Progressing Towards Goal  Note: Pressure Injury Interventions:  Sensory Interventions: Assess changes in LOC    Moisture Interventions: Apply protective barrier, creams and emollients    Activity Interventions: Assess need for specialty bed    Mobility Interventions: Assess need for specialty bed    Nutrition Interventions: Document food/fluid/supplement intake    Friction and Shear Interventions: Apply protective barrier, creams and emollients                Problem: Pressure Injury - Risk of  Goal: *Prevention of pressure injury  Description: Document Darío Scale and appropriate interventions in the flowsheet.   Outcome: Progressing Towards Goal  Note: Pressure Injury Interventions:  Sensory Interventions: Assess changes in LOC    Moisture Interventions: Apply protective barrier, creams and emollients    Activity Interventions: Assess need for specialty bed    Mobility Interventions: Assess need for specialty bed    Nutrition Interventions: Document food/fluid/supplement intake    Friction and Shear Interventions: Apply protective barrier, creams and emollients                Problem: Patient Education: Go to Patient Education Activity  Goal: Patient/Family Education  Outcome: Progressing Towards Goal     Problem: Pain  Goal: *Control of Pain  Outcome: Progressing Towards Goal  Goal: *PALLIATIVE CARE:  Alleviation of Pain  Outcome: Progressing Towards Goal     Problem: Patient Education: Go to Patient Education Activity  Goal: Patient/Family Education  Outcome: Progressing Towards Goal     Problem: General Medical Care Plan  Goal: *Vital signs within specified parameters  Outcome: Progressing Towards Goal  Goal: *Labs within defined limits  Outcome: Progressing Towards Goal  Goal: *Absence of infection signs and symptoms  Outcome: Progressing Towards Goal  Goal: *Optimal pain control at patient's stated goal  Outcome: Progressing Towards Goal  Goal: *Skin integrity maintained  Outcome: Progressing Towards Goal  Goal: *Fluid volume balance  Outcome: Progressing Towards Goal  Goal: *Optimize nutritional status  Outcome: Progressing Towards Goal  Goal: *Anxiety reduced or absent  Outcome: Progressing Towards Goal  Goal: *Progressive mobility and function (eg: ADL's)  Outcome: Progressing Towards Goal     Problem: Patient Education: Go to Patient Education Activity  Goal: Patient/Family Education  Outcome: Progressing Towards Goal     Problem: Patient Education: Go to Patient Education Activity  Goal: Patient/Family Education  Outcome: Progressing Towards Goal     Problem: Patient Education: Go to Patient Education Activity  Goal: Patient/Family Education  Outcome: Progressing Towards Goal

## 2022-08-17 NOTE — CONSULTS
ID consult  Reason for consult-multiple antibiotic allergies, patient requesting p.o. therapy,   cellulitis of right forearm    Impression   -R/forearm wound   Pt admits to skin popping   Wound reviewed. Large, relatively clean     -R/knee effusion. Chronic  S/p Ortho evaluation    Minimal warmth +. S/p Ortho evaluation. Septic arthritis less likely. - No h/o MRSA  -Polysubstance abuse, skin popping.    - Multiple antibiotic allergies      Plan      Continue Daptomycin/ Cefepime IV  -Await cultures  DC on antibiotics based on culture  If pt wants to be DC before that recommend Levaquin po  Pt would require out patient wound care . -HIV , Hepatitis sceeening. Oni Morton  is a 59-year-old female with a past medical history significant for a prior right knee surgery who presented to the emergency department complaining of right knee swelling as well as a chronic right arm wound. She stated that around 1 month ago, she fell twisting her right knee and scraping her right forearm. She reports that since this time, she has been having worsening of her right forearm wound as well as worsening swelling and redness of her right knee. It had gotten particularly bad in the last couple of days prompting an emergency room visit. She  had stated that she had been having difficulty ambulating secondary to her right knee pain and swelling. No h/o fever/chills, headache, dizziness, chest pain, palpitations, cough, shortness of breath, abdominal pain or nausea/vomiting. She initially presented to the [facility] emergency department on 8/13. At that time, she was noted to be tachycardic with otherwise stable vital signs within normal limits. An initial CBC was significant for a white blood cell count of 10.6 and hemoglobin of 8.5. An initial CMP was significant for a potassium of 2.9.   A CT scan of her right knee was concerning for inflammatory versus septic arthritis which is acute or chronic. She was given IV daptomycin and recommended that her right knee be drained however she requested that this be done by an orthopedic surgeon who was not present at [facility].  She was transferred to [facility]. Pt has  been seen by Ortho for swelling R/knee . ID service has been contacted given the cellulitis & large wound of R/forearm, multiple antibiotic allergies. Wound dressing on  Wound photo reviewed. Patient seen today. Afebrile  Patient denies new complaints. Declines IV medications of blood draws.   Case discussed with hospitalist    Patient Active Problem List   Diagnosis Code    Septic arthritis ([facility] M00.9    Right forearm cellulitis L03.113    Hypokalemia E87.6    Anemia D64.9     Past Medical History:   Diagnosis Date    IV drug abuse ([facility]      Past Surgical History:   Procedure Laterality Date    HX ORTHOPAEDIC      R knee     Allergies   Allergen Reactions    Zofran [Ondansetron Hcl (Pf)] Anaphylaxis    Amoxicillin Unknown (comments)    Bactrim [Sulfamethoprim Ds] Unknown (comments)    Darvocet A500 [Propoxyphene N-Acetaminophen] Rash    Doxycycline Unknown (comments)    Penicillin V Unknown (comments)    Ultram [Tramadol] Unknown (comments)    Vancomycin Itching     Pt reports itching and lip swelling     Social History     Socioeconomic History    Marital status: SINGLE     Spouse name: Not on file    Number of children: Not on file    Years of education: Not on file    Highest education level: Not on file   Occupational History    Not on file   Tobacco Use    Smoking status: Never    Smokeless tobacco: Never   Substance and Sexual Activity    Alcohol use: No    Drug use: No    Sexual activity: Not on file   Other Topics Concern    Not on file   Social History Narrative    Not on file     Social Determinants of Health     Financial Resource Strain: Not on file   Food Insecurity: Not on file   Transportation Needs: Not on file   Physical Activity: Not on file   Stress: Not on file   Social Connections: Not on file   Intimate Partner Violence: Not on file   Housing Stability: Not on file     Family Status   Relation Name Status    Father  (Not Specified)     Medication Documentation Review Audit       Reviewed by Myesha Sahu RN (Registered Nurse) on 08/16/22 at 21 [phone]      Medication Sig Documenting Provider Last Dose Status Taking? clindamycin (CLEOCIN) 300 mg capsule Take 1 Capsule by mouth four (4) times daily for 7 days. Macie Quezada MD  Active    HYDROcodone-acetaminophen [facility]) 5-325 mg per tablet Take 1 tablet by mouth every eight (8) hours as needed for Pain for up to 8 doses. Taiwo Sullivan MD  Active    promethazine (PHENERGAN) 25 mg suppository Insert 1 suppository into rectum every six (6) hours as needed for Nausea for up to 7 doses. Taiwo Sullivan MD  Active                   Review of Systems - Negative except those mentioned in H&P.  14 point ROS obtained  PHYSICAL EXAM:  General:          WD, WN. Alert, cooperative, no acute distress    EENT:              EOMI. Anicteric sclerae. MMM  Resp:               CTA bilaterally, no wheezing or rales. No accessory muscle use  CV:                  Regular  rhythm,  No edema  GI:                   Soft, Non distended, Non tender. +Bowel sounds  Neurologic:      Alert and oriented X 3, normal speech,   Psych:             Good insight. Not anxious nor agitated  Skin:                No rashes. No jaundice.     Lizabeth Cooks, MD [place]

## 2022-08-17 NOTE — PROGRESS NOTES
Problem: Self Care Deficits Care Plan (Adult)  Goal: *Acute Goals and Plan of Care (Insert Text)  Description: FUNCTIONAL STATUS PRIOR TO ADMISSION: Patient was modified independent using a rolling walker for functional mobility. Patient was modified independent for basic and instrumental ADLs. HOME SUPPORT: The patient lived with sister. Occupational Therapy Goals  Initiated 8/16/2022  1. Patient will perform grooming in standing with supervision/set-up within 7 day(s). 2.  Patient will perform lower body dressing with minimal assistance within 7 day(s). 3.  Patient will perform seated sponge bathing with minimal assistance within 7 day(s). 4.  Patient will perform toilet transfers with supervision/set-up within 7 day(s). 5.  Patient will perform all aspects of toileting with supervision/set-up within 7 day(s). Outcome: Progressing Towards Goal   OCCUPATIONAL THERAPY TREATMENT  Patient: Jerzy Domínguez (24 y.o. female)  Date: 8/17/2022  Diagnosis: Septic arthritis ([facility] [M00.9] Septic arthritis [facility])      Precautions: Fall, Bed Alarm  Chart, occupational therapy assessment, plan of care, and goals were reviewed. ASSESSMENT  Patient continues with skilled OT services and is slowly progressing towards goals. Patient received with PT transferring to [facility] via T transfer- refusing squat pivot transfer method. Pt refusing to place BLEs on the ground, propping on bed. Required increased time for unsuccessful toileting attempt,  refusing assistance to transition to bed from [facility]. Pt in flexed position with RUE and RLE stuck underneath her, multiple attempts to assist pt made however refusing assist despite provided education and cues. Pt with increased pain reported after increased time, therapist provided max A x2 to scoot patient to center of bed. Call bell provided, all need met. Pt requesting methadone, RN notified and aware.      Current Level of Function Impacting Discharge (ADLs): SPV - max A pending participation          PLAN :  Patient continues to benefit from skilled intervention to address the above impairments. Continue treatment per established plan of care to address goals. Recommendation for discharge: (in order for the patient to meet his/her long term goals)  rehab    This discharge recommendation:  Has been made in collaboration with the attending provider and/or case management    IF patient discharges home will need the following DME: tbd       SUBJECTIVE:   Patient stated dont touch me.     OBJECTIVE DATA SUMMARY:   Cognitive/Behavioral Status:  Neurologic State: Alert  Orientation Level: Oriented X4                Functional Mobility and Transfers for ADLs:  Bed Mobility:  Rolling: Maximum assistance  Supine to Sit: Stand-by assistance  Sit to Supine: Moderate assistance  Scooting: Moderate assistance    Transfers:     Functional Transfers  Toilet Transfer : Supervision (T transfer to [facility])       Balance:  Sitting: Impaired  Sitting - Static: Good (unsupported)  Sitting - Dynamic: Good (unsupported)  Standing: Impaired (NT)    ADL Intervention:   Increased time required for all ADLs as pt with increased pain, refusing therapist assistance. Toileting  Toileting Assistance: Maximum assistance    Pain:  Pt endorsing increased pain in R forearm and BLEs. Activity Tolerance:   Poor    After treatment patient left in no apparent distress:   Supine in bed, Call bell within reach, Bed / chair alarm activated, and Side rails x 3    COMMUNICATION/COLLABORATION:   The patients plan of care was discussed with: Physical therapist, Occupational therapist, and Registered nurse.      Jose Martin Pierre OT  Time Calculation: 24 mins

## 2022-08-17 NOTE — PROGRESS NOTES
Transition of Care Plan:    RUR:8% low risk   Disposition: home with home health   Follow up appointments: PCP   DME needed: rolling walker   Transportation at Discharge: sister to transport   Hettie Moles or means to access home: Pt has access       IM Medicare Letter: N/a   Is patient a [place] and connected with the [place]? N/a           If yes, was [employer] transfer form completed and VA notified? N/a  Caregiver Contact: sister John Jeffries: [phone]  Discharge Caregiver contacted prior to discharge? Pt to contact   Care Conference needed?:   no                     Reason for Admission:  Right knee swelling/pain  Right upper extremity cellulitis                   RUR Score:   8% low risk                  Plan for utilizing home health:  home health recommended         PCP: First and Last name:  None     Name of Practice:    Are you a current patient: Yes/No:    Approximate date of last visit:    Can you participate in a virtual visit with your PCP:                     Current Advanced Directive/Advance Care Plan: Full Code      Healthcare Decision Maker:   Click here to complete [address] including selection of the Healthcare Decision Maker Relationship (ie \"Primary\")                             Transition of Care Plan:                      Pt is a 55year old female admitted to [address] [place] [address] on 8/14/2022. Pt was alert and oriented x4 during initial assessment. Pt reports to live with her sister in a single level apartment. Pt reports to receive support from her sister. Pt reports that she owns a rollator.      Care Management Interventions  PCP Verified by CM: No (CM to arrance new PCP)  Mode of Transport at Discharge: BLS  Transition of Care Consult (CM Consult): Discharge Planning  Discharge Durable Medical Equipment: No  Physical Therapy Consult: Yes  Occupational Therapy Consult: Yes  Support Systems: Other Family Member(s)  Confirm Follow Up Transport: Family  Discharge Location  Patient Expects to be Discharged to[de-identified] Home with home health    Simmie Goltz, [place], [facility]

## 2022-08-17 NOTE — PROGRESS NOTES
8/19/22 1405 [facility] follow-up PCP transitional care appointment has been scheduled with JOHNNY Carranza on 8/23/22 at 1030. Pending patient discharge. Boaz Davis     3203 - Attempted to make NEW PATIENT appointment with the [facility]. Unable to schedule appointment due. Boaz Davis    8/18/22 1509 - Attempted to schedule NEW PATIENT hospital follow up PCP appointment with [facility]. Unable to reach anyone, unable to leave voicemail. Pending patient discharge. Boaz Davis    [facility] follow-up PCP transitional care appointment has been scheduled with Dr. Taye Escalona on 10/6/22 at 1430. Pending patient discharge.   Boaz Davis

## 2022-08-17 NOTE — PROGRESS NOTES
Hospitalist Progress Note    NAME: Kayli Lagunas   :  1975   MRN:  [id number]            Subjective:     Chief Complaint / Reason for Physician Visit  Patient seen and evaluated at bedside, patient currently complaining of knee discomfort, refusing IV Abx, wants to switch to oral Abx, wants to pursue SNF placement  Discussed with RN events overnight. Review of Systems:  Symptom Y/N Comments  Symptom Y/N Comments   Fever/Chills N   Chest Pain N    Poor Appetite N   Edema N    Cough N   Abdominal Pain N    Sputum N   Joint Pain Y    SOB/CHAMBERLAIN N   Pruritis/Rash N    Nausea/vomiting N   Tolerating PT/OT Y    Abdominal pain N   Tolerating Diet Y    Constipation N   Other       Could NOT obtain due to:      Objective:     VITALS:   Last 24hrs VS reviewed since prior progress note. Most recent are:  Patient Vitals for the past 24 hrs:   Temp Pulse Resp BP SpO2   22 0856 98.7 °F (37.1 °C) 79 18 126/76 97 %   22 0400 97.8 °F (36.6 °C) 80 18 126/74 98 %   22 2200 98 °F (36.7 °C) 86 18 128/68 98 %   22 1602 99.1 °F (37.3 °C) 91 18 (!) 142/87 100 %       Intake/Output Summary (Last 24 hours) at 2022 1131  Last data filed at 2022 0740  Gross per 24 hour   Intake --   Output 800 ml   Net -800 ml        PHYSICAL EXAM:  General: Patient appears comfortable    EENT:  EOMI. Anicteric sclerae. MMM  Resp:  CTA bilaterally, no wheezing or rales. No accessory muscle use  CV:  Regular  rhythm, s1/s2 no m/r/g  No edema  GI:  Soft, Non distended, Non tender. +Bowel sounds  Neurologic:  Alert and oriented X 3, normal speech,   Psych:   Good insight. Not anxious nor agitated  Skin:      Procedures: see electronic medical records for all procedures/Xrays and details which were not copied into this note but were reviewed prior to creation of Plan. LABS:  I reviewed today's most current labs and imaging studies.   Pertinent labs include:  Recent Labs     08/15/22  0349 08/15/22  0104   WBC 8.2 8. 1   HGB 7.9* 8.6*   HCT 26.4* 28.8*   * 458*     Recent Labs     08/15/22  0349 08/15/22  0104    136   K 4.0 3.3*    104   CO2 25 25   GLU 78 80   BUN 8 7   CREA 0.63 0.68   CA 8.8 9.2   ALB 1.3* 1.5*   TBILI 0.5 0.4   ALT 8* 8*       Signed: Denise Layton MD    CT right lower extremity:IMPRESSION  1. Inflammatory versus septic arthritis is subacute or chronic. 2. No fracture. 3. No drainable extra-articular abscess. Bilateral lower extremity duplex:Right Lower Venous    Technically difficult exam due to: marked edema and inability to withstand probe pressure. No evidence of deep vein thrombosis in the common femoral, profunda femoral, femoral, popliteal, posterior tibial, and peroneal veins. The veins were imaged in the transverse and longitudinal planes. The vessels showed normal color filling and compressibility. Doppler interrogation showed phasic and spontaneous flow. Left Lower Venous    For comparison purposes, the left common femoral vein was briefly interrogated. These vein demonstrates normal color filing and compressibility. Doppler flow was phasic and spontaneous.        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, recommend conservative management at this time  Continue current pain regimen  Continue to follow-up physical therapy Occupational Therapy recommendations  Orthopedic surgery consult appreciated, continue to follow recommendations    Right upper extremity cellulitis-of note patient has a chronic right upper extremity wound, concerns for timoteo cazares right upper extremity cellulitis, does not meet sepsis criteria at this time, patient is currently refusing IV antibiotics, is requesting to change to oral antibiotics, patient has multiple allergies to multiple antibiotics that would be appropriate for cellulitis  Obtain wound culture  Follow-up blood cultures  Obtain infectious disease input for alternative antibiotic regimen  Continue to monitor    Hypokalemia-currently resolved    History of heroin use-patient not on methadone, is getting third dose of methadone as per protocol  Continue to monitor    Prophylaxis-Heparin subcu  FEN-regular diet, replete potassium and magnesium  Full code, will clarify about surrogate decision-maker  [address] for discharge to skilled nursing facility after antibiotic regimen clarification by infectious disease      18.5 - 24.9 Normal weight / Body mass index is 24.89 kg/m². Code status: Full  Prophylaxis: Hep SQ  Recommended Disposition: SNF/LTC     ________________________________________________________________________  Care Plan discussed with:    Comments   Patient x    Family      RN x    Care Manager x    Consultant  x                     x Multidiciplinary team rounds were held today with , nursing, pharmacist and clinical coordinator. Patient's plan of care was discussed; medications were reviewed and discharge planning was addressed.      ________________________________________________________________________  Total NON critical care TIME:  35   Minutes      Comments   >50% of visit spent in counseling and coordination of care x    ________________________________________________________________________  Aravind Bermudez MD

## 2022-08-17 NOTE — PROGRESS NOTES
Bedside shift change report given to Amol Dover RN  (oncoming nurse) by Samanta Olivarez RN (offgoing nurse).   Report included the following information SBAR, Kardex, MAR, and Elviaion

## 2022-08-17 NOTE — PROGRESS NOTES
Problem: Mobility Impaired (Adult and Pediatric)  Goal: *Acute Goals and Plan of Care (Insert Text)  Description: FUNCTIONAL STATUS PRIOR TO ADMISSION: Patient was modified independent using a rolling walker and single point cane for functional mobility. HOME SUPPORT PRIOR TO ADMISSION: The patient lived with sister. Physical Therapy Goals  Initiated 8/16/2022  1. Patient will move from supine to sit and sit to supine  in bed with minimal assistance/contact guard assist within 7 day(s). 2.  Patient will transfer from bed to chair and chair to bed with minimal assistance/contact guard assist using the least restrictive device within 7 day(s). 3.  Patient will perform sit to stand with minimal assistance/contact guard assist within 7 day(s). 4.  Patient will ambulate with minimal assistance/contact guard assist for 2 feet with the least restrictive device within 7 day(s). Outcome: Progressing Towards Goal   PHYSICAL THERAPY TREATMENT  Patient: Dariana Umaña (5 y.o. female)  Date: 8/17/2022  Diagnosis: Septic arthritis ([facility] [[email or web address] [M00.9] Septic arthritis [facility])      Precautions: Fall, Bed Alarm  Chart, physical therapy assessment, plan of care and goals were reviewed. ASSESSMENT  Patient continues with skilled PT services and is progressing towards goals. Patient limited by pain and is otherwise very self limiting and reluctant to follow recommendations provided by therapy. Patient supine upon entry to room and agreeable to transfer to commode as patient stating she needed to have a BM. She refuses to allow PT to set up commode for lateral transfer and insists on a T-transfer to commode. Overall supervision onto commode and keeps legs up on bed throughout duration of using the commode. Transfers back to bed with T-transfer but having increased difficulty secondary to pain as well as transferring \"up hill\". Does not allow PT/OT to assist her and screams in pain when attempted to assist her. Patient continues below baseline and anticipate she would benefit from Rehab at [facility] to progress to more independent level of function. Unsure that she will be very receptive to rehab or be receptive to teaching she would receive in rehab. Other factors to consider for discharge: pain limits mobility, patient unreceptive education         PLAN :  Patient continues to benefit from skilled intervention to address the above impairments. Continue treatment per established plan of care. to address goals. Recommendation for discharge: (in order for the patient to meet his/her long term goals)  Rehab setting:  SNF vs IPR      IF patient discharges home will need the following DME: bedside commode and rolling walker       SUBJECTIVE:   Patient stated I need to Sh*t.    OBJECTIVE DATA SUMMARY:   Critical Behavior:  Neurologic State: Alert  Orientation Level: Oriented X4  Cognition: Decreased command following, Impaired decision making, Poor safety awareness  Safety/Judgement: Awareness of environment, Decreased insight into deficits, Fall prevention  Functional Mobility Training:  Bed Mobility:  Rolling: Supervision  Supine to Sit: Supervision  Sit to Supine: Supervision  Scooting: Supervision        Transfers:              Bed to Chair: Supervision (T-transfer bed to chair)                    Balance:  Sitting: Impaired  Sitting - Static: Good (unsupported)  Sitting - Dynamic: Fair (occasional)  Standing: Impaired (NT)  Ambulation/Gait Training:            Pain Rating:  Reports high pain levels but does not rate    Activity Tolerance:   Poor and requires rest breaks    After treatment patient left in no apparent distress:   Supine in bed, Call bell within reach, Bed / chair alarm activated, and Side rails x 3    COMMUNICATION/COLLABORATION:   The patients plan of care was discussed with: Physical therapist, Occupational therapist, and Registered nurse.      Apurva Viera, PT, DPT   Time Calculation: 25 mins

## 2022-08-18 LAB
BACTERIA SPEC CULT: NORMAL
SERVICE CMNT-IMP: NORMAL

## 2022-08-18 PROCEDURE — 74011250637 HC RX REV CODE- 250/637: Performed by: INTERNAL MEDICINE

## 2022-08-18 PROCEDURE — 74011250637 HC RX REV CODE- 250/637: Performed by: HOSPITALIST

## 2022-08-18 PROCEDURE — 65270000046 HC RM TELEMETRY

## 2022-08-18 PROCEDURE — 99233 SBSQ HOSP IP/OBS HIGH 50: CPT | Performed by: INTERNAL MEDICINE

## 2022-08-18 RX ORDER — LEVOFLOXACIN 750 MG/1
750 TABLET ORAL EVERY 24 HOURS
Status: DISCONTINUED | OUTPATIENT
Start: 2022-08-18 | End: 2022-08-19 | Stop reason: HOSPADM

## 2022-08-18 RX ADMIN — LEVOFLOXACIN 750 MG: 750 TABLET, FILM COATED ORAL at 20:16

## 2022-08-18 RX ADMIN — HYDROCODONE BITARTRATE AND ACETAMINOPHEN 1 TABLET: 5; 325 TABLET ORAL at 20:17

## 2022-08-18 NOTE — PROGRESS NOTES
Problem: Falls - Risk of  Goal: *Absence of Falls  Description: Document Sumeet Stevens Fall Risk and appropriate interventions in the flowsheet. Outcome: Progressing Towards Goal  Note: Fall Risk Interventions:  Mobility Interventions: Bed/chair exit alarm, Patient to call before getting OOB, PT Consult for mobility concerns, Utilize walker, cane, or other assistive device, Utilize gait belt for transfers/ambulation         Medication Interventions: Teach patient to arise slowly, Patient to call before getting OOB, Bed/chair exit alarm    Elimination Interventions: Call light in reach, Patient to call for help with toileting needs, Toileting schedule/hourly rounds    History of Falls Interventions: Bed/chair exit alarm, Consult care management for discharge planning, Door open when patient unattended, Utilize gait belt for transfer/ambulation         Problem: Patient Education: Go to Patient Education Activity  Goal: Patient/Family Education  Outcome: Progressing Towards Goal     Problem: Pressure Injury - Risk of  Goal: *Prevention of pressure injury  Description: Document Darío Scale and appropriate interventions in the flowsheet. Outcome: Progressing Towards Goal  Note: Pressure Injury Interventions:  Sensory Interventions: Assess changes in LOC    Moisture Interventions: Absorbent underpads    Activity Interventions: PT/OT evaluation, Increase time out of bed, Chair cushion    Mobility Interventions: HOB 30 degrees or less, Pressure redistribution bed/mattress (bed type), PT/OT evaluation, Turn and reposition approx.  every two hours(pillow and wedges)    Nutrition Interventions: Document food/fluid/supplement intake    Friction and Shear Interventions: Apply protective barrier, creams and emollients                Problem: Patient Education: Go to Patient Education Activity  Goal: Patient/Family Education  Outcome: Progressing Towards Goal     Problem: Pain  Goal: *Control of Pain  Outcome: Progressing Towards Goal  Goal: *PALLIATIVE CARE:  Alleviation of Pain  Outcome: Progressing Towards Goal     Problem: Patient Education: Go to Patient Education Activity  Goal: Patient/Family Education  Outcome: Progressing Towards Goal     Problem: General Medical Care Plan  Goal: *Vital signs within specified parameters  Outcome: Progressing Towards Goal  Goal: *Labs within defined limits  Outcome: Progressing Towards Goal  Goal: *Absence of infection signs and symptoms  Outcome: Progressing Towards Goal  Goal: *Optimal pain control at patient's stated goal  Outcome: Progressing Towards Goal  Goal: *Skin integrity maintained  Outcome: Progressing Towards Goal  Goal: *Fluid volume balance  Outcome: Progressing Towards Goal  Goal: *Optimize nutritional status  Outcome: Progressing Towards Goal  Goal: *Anxiety reduced or absent  Outcome: Progressing Towards Goal  Goal: *Progressive mobility and function (eg: ADL's)  Outcome: Progressing Towards Goal     Problem: Patient Education: Go to Patient Education Activity  Goal: Patient/Family Education  Outcome: Progressing Towards Goal     Problem: Patient Education: Go to Patient Education Activity  Goal: Patient/Family Education  Outcome: Progressing Towards Goal     Problem: Patient Education: Go to Patient Education Activity  Goal: Patient/Family Education  Outcome: Progressing Towards Goal     Problem: Pressure Injury - Risk of  Goal: *Prevention of pressure injury  Description: Document Darío Scale and appropriate interventions in the flowsheet. Outcome: Progressing Towards Goal  Note: Pressure Injury Interventions:  Sensory Interventions: Assess changes in LOC    Moisture Interventions: Absorbent underpads    Activity Interventions: PT/OT evaluation, Increase time out of bed, Chair cushion    Mobility Interventions: HOB 30 degrees or less, Pressure redistribution bed/mattress (bed type), PT/OT evaluation, Turn and reposition approx.  every two hours(pillow and wedges)    Nutrition Interventions: Document food/fluid/supplement intake    Friction and Shear Interventions: Apply protective barrier, creams and emollients                Problem: General Medical Care Plan  Goal: *Anxiety reduced or absent  Outcome: Progressing Towards Goal     Problem: General Medical Care Plan  Goal: *Absence of infection signs and symptoms  Outcome: Progressing Towards Goal     Problem: General Medical Care Plan  Goal: *Fluid volume balance  Outcome: Progressing Towards Goal     Problem: General Medical Care Plan  Goal: *Skin integrity maintained  Outcome: Progressing Towards Goal

## 2022-08-18 NOTE — PROGRESS NOTES
Hospitalist Progress Note    NAME: Radha Carmona   :  1975   MRN:  [id number]            Subjective:     Chief Complaint / Reason for Physician Visit  Patient seen and evaluated at bedside, patient currently has no new complaints, knee pain has improved  Discussed with RN events overnight. Review of Systems:  Symptom Y/N Comments  Symptom Y/N Comments   Fever/Chills N   Chest Pain N    Poor Appetite N   Edema N    Cough N   Abdominal Pain N    Sputum N   Joint Pain Y    SOB/CHAMBERLAIN N   Pruritis/Rash N    Nausea/vomiting N   Tolerating PT/OT Y    Abdominal pain N   Tolerating Diet Y    Constipation N   Other       Could NOT obtain due to:      Objective:     VITALS:   Last 24hrs VS reviewed since prior progress note. Most recent are:  Patient Vitals for the past 24 hrs:   Temp Pulse Resp BP SpO2   22 0843 -- 82 18 126/85 100 %   22 0437 98 °F (36.7 °C) 80 18 (!) 143/90 98 %   22 2310 97.5 °F (36.4 °C) 78 18 126/78 96 %   22 2000 98.1 °F (36.7 °C) 80 18 128/80 98 %   22 1608 98.7 °F (37.1 °C) 81 16 121/86 95 %       No intake or output data in the 24 hours ending 22 1121       PHYSICAL EXAM:  General: Patient appears comfortable    EENT:  EOMI. Anicteric sclerae. MMM  Resp:  CTA bilaterally, no wheezing or rales. No accessory muscle use  CV:  Regular  rhythm, s1/s2 no m/r/g  No edema  GI:  Soft, Non distended, Non tender. +Bowel sounds  Neurologic:  Alert and oriented X 3, normal speech,   Psych:   Good insight. Not anxious nor agitated  Skin:      Procedures: see electronic medical records for all procedures/Xrays and details which were not copied into this note but were reviewed prior to creation of Plan. LABS:  I reviewed today's most current labs and imaging studies. Pertinent labs include:  No results for input(s): WBC, HGB, HCT, PLT, HGBEXT, HCTEXT, PLTEXT, HGBEXT, HCTEXT, PLTEXT in the last 72 hours.     No results for input(s): NA, K, CL, CO2, GLU, BUN, CREA, CA, MG, PHOS, ALB, TBIL, TBILI, ALT, INR, INREXT, INREXT in the last 72 hours. No lab exists for component: SGOT      Signed: Monica Sebastian MD    CT right lower extremity:IMPRESSION  1. Inflammatory versus septic arthritis is subacute or chronic. 2. No fracture. 3. No drainable extra-articular abscess. Bilateral lower extremity duplex:Right Lower Venous    Technically difficult exam due to: marked edema and inability to withstand probe pressure. No evidence of deep vein thrombosis in the common femoral, profunda femoral, femoral, popliteal, posterior tibial, and peroneal veins. The veins were imaged in the transverse and longitudinal planes. The vessels showed normal color filling and compressibility. Doppler interrogation showed phasic and spontaneous flow. Left Lower Venous    For comparison purposes, the left common femoral vein was briefly interrogated. These vein demonstrates normal color filing and compressibility. Doppler flow was phasic and spontaneous.        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, recommend conservative management at this time  Continue current pain regimen  Continue to follow-up physical therapy Occupational Therapy recommendations  Orthopedic surgery consult appreciated, continue to follow recommendations    Right upper extremity cellulitis-of note patient has a chronic right upper extremity wound, concerns for timoteo mitten right upper extremity cellulitis, does not meet sepsis criteria at this time, patient is currently refusing IV antibiotics, is requesting to change to oral antibiotics, patient has multiple allergies to multiple antibiotics that would be appropriate for cellulitis  Follow up wound culture  Follow-up blood cultures  Infectious Disease consult appreciated, may be able to transition to oral levaquin depending on results  Continue to monitor    Hypokalemia-currently resolved    History of heroin use-completed 3 dose methadone as per protocol  Continue to monitor    Prophylaxis-Heparin subcu  FEN-regular diet, replete potassium and magnesium  Full code, will clarify about surrogate decision-maker  [address] for discharge to skilled nursing facility after antibiotic regimen clarification by infectious disease      18.5 - 24.9 Normal weight / Body mass index is 24.89 kg/m². Code status: Full  Prophylaxis: Hep SQ  Recommended Disposition: SNF/LTC     ________________________________________________________________________  Care Plan discussed with:    Comments   Patient x    Family      RN x    Care Manager x    Consultant  x                     x Multidiciplinary team rounds were held today with , nursing, pharmacist and clinical coordinator. Patient's plan of care was discussed; medications were reviewed and discharge planning was addressed.      ________________________________________________________________________  Total NON critical care TIME:  35   Minutes      Comments   >50% of visit spent in counseling and coordination of care x    ________________________________________________________________________  Monica Sebastian MD

## 2022-08-18 NOTE — PROGRESS NOTES
Problem: Falls - Risk of  Goal: *Absence of Falls  Description: Document Avis Led Fall Risk and appropriate interventions in the flowsheet.   Outcome: Progressing Towards Goal  Note: Fall Risk Interventions:  Mobility Interventions: Bed/chair exit alarm         Medication Interventions: Bed/chair exit alarm    Elimination Interventions: Bed/chair exit alarm    History of Falls Interventions: Bed/chair exit alarm

## 2022-08-18 NOTE — PROGRESS NOTES
Infectious Disease progress      Impression  RUE wound clean, large  H/o skin popping. R/knee Swelling  Chronic  S/p Ortho evaluation    H/o Polysubstance abuse    Unknown HIV, Hepatitis status  Pt refusing  labs, medications. H/o multiple antibiotic allergies-PCN, Bactrim,  Vancomycin, doxycycline. Plan  Pt has been declining labs, antibiotics  Change to po Levaquin, continue x 7 days   Pt will need out patient Wound care  May DC from ID stand point. Will sign off, please call as needed. Patient seen today. Denies new complaints  States she is not going to take anything IV  Patient agreeable to p.o. antibiotic. Active Hospital Problems    Diagnosis Date Noted    Right forearm cellulitis 08/15/2022    Hypokalemia 08/15/2022    Anemia 08/15/2022    Septic arthritis ([facility] [address] 08/14/2022         Past Medical History:   Diagnosis Date    IV drug abuse ([facility] [address]        Past Surgical History:   Procedure Laterality Date    HX ORTHOPAEDIC      R knee       Allergies   Allergen Reactions    Zofran [Ondansetron Hcl (Pf)] Anaphylaxis    Amoxicillin Unknown (comments)    Bactrim [Sulfamethoprim Ds] Unknown (comments)    Darvocet A500 [Propoxyphene N-Acetaminophen] Rash    Doxycycline Unknown (comments)    Penicillin V Unknown (comments)    Ultram [Tramadol] Unknown (comments)    Vancomycin Itching     Pt reports itching and lip swelling       Social Connections: Not on file       Family Status   Relation Name Status    Father  (Not Specified)       Medication Documentation Review Audit       Reviewed by Claudio Sanchez RN (Registered Nurse) on 08/16/22 at 8090      Medication Sig Documenting Provider Last Dose Status Taking? clindamycin (CLEOCIN) 300 mg capsule Take 1 Capsule by mouth four (4) times daily for 7 days. Katie Simeon MD  Active    HYDROcodone-acetaminophen [facility]) 5-325 mg per tablet Take 1 tablet by mouth every eight (8) hours as needed for Pain for up to 8 doses.  Nicolasa Vasquez MD  Active promethazine (PHENERGAN) 25 mg suppository Insert 1 suppository into rectum every six (6) hours as needed for Nausea for up to 7 doses. Kallie Pineda MD  Active                       Review of Systems - Negative except those mentioned in H&P.  14 point ROS obtained      PHYSICAL EXAM:  General:          Awake, cooperative, no acute distress    EENT:              EOMI. Anicteric sclerae. MMM  Resp:               CTA bilaterally, no wheezing or rales. No accessory muscle use  CV:                  Regular  rhythm,  No edema  GI:                   Soft, Non distended, Non tender. +Bowel sounds  Neurologic:      Alert and oriented X 3, normal speech,   Psych:             Good insight. Not anxious nor agitated  Skin:                No rashes. No jaundice.   Extremity: R/knee -minimal swelling, not warm  R/upper extremity-wound dressing+    MD Arnulfo Ha

## 2022-08-18 NOTE — PROGRESS NOTES
Bedside shift change report given to Tea Rhodes RN (oncoming nurse) by JOSE A Canela (offgoing nurse). Report included the following information SBAR, Kardex, Intake/Output, MAR, and Recent Results. 5353: pt. Refused medications this morning. Refused to allow RN to assess R upper extremity dressing/wound. Pt. Educated on importance of assessment of wound to ensure proper healing and prevention of infection. Discussed with patient the reasoning for antibiotics due to blood cultures still pending. 1205: pt. Continues to refuse medications. MD aware. Director aware. End of Shift Note    Bedside shift change report given to Chayo South (oncoming nurse) by Johan Fierro RN (offgoing nurse). Report included the following information SBAR, Kardex, and MAR    Shift worked:  7a-7p     Shift summary and any significant changes:     Pt. Continues to refuse medications. Educated throughout shift. Pt. Would not allow RN to assess RUE wound. Concerns for physician to address: Blood cultures pending     Zone phone for oncoming shift:   [phone]       Activity:  Activity Level: Bed Rest  Number times ambulated in hallways past shift: 0  Number of times OOB to chair past shift: 0    Cardiac:   Cardiac Monitoring: No      Cardiac Rhythm: Sinus Rhythm    Access:  Current line(s): PIV     Genitourinary:   Urinary status: voiding    Respiratory:   O2 Device: None (Room air)  Chronic home O2 use?: N/A  Incentive spirometer at bedside: N/A       GI:     Current diet:  ADULT DIET Regular  Passing flatus: YES  Tolerating current diet: YES       Pain Management:   Patient states pain is manageable on current regimen: N/A    Skin:  Darío Score: 18  Interventions: PT/OT consult    Patient Safety:  Fall Score:  Total Score: 3  Interventions: bed/chair alarm, gripper socks, pt to call before getting OOB, and stay with me (per policy)  High Fall Risk: Yes    Length of Stay:  Expected LOS: 4d 0h  Actual LOS: 79-01 Mariela RN

## 2022-08-18 NOTE — PROGRESS NOTES
End of Shift Note    Bedside shift change report given to  Daxa Trevizo RN (oncoming nurse) by Roger Morales RN (offgoing nurse). Report included the following information     Shift worked:  7P-7:30am   Shift summary and any significant changes:     None, Patient refused almost everything except vital sign. Concerns for physician to address:  Patient refused every treatments only allow vitals to be taken. Zone phone for oncoming shift:   [phone]     Patient Information  Reynaldo Szymanski  55 y.o.  8/14/2022 10:16 PM by Jackson Carvajal MD. Reynaldo Szymanski was admitted from ED    Problem List  Patient Active Problem List    Diagnosis Date Noted    Right forearm cellulitis 08/15/2022    Hypokalemia 08/15/2022    Anemia 08/15/2022    Septic arthritis ([facility] [address] 08/14/2022     Past Medical History:   Diagnosis Date    IV drug abuse ([facility] [address]        Core Measures: Activity:  Activity Level: Bed Rest  Number times ambulated in hallways past shift:0  Cardiac:   Cardiac Monitoring: N     Cardiac Rhythm: Sinus Rhythm    Access:   Current line(s):PIV  Genitourinary:   Urinary status: voiding  Urinary Catheter? P    Respiratory:     O2 Device: None (Room air)  Chronic home O2 use?:   Incentive spirometer at bedside:     GI:              Pain Management:   Patient states pain is manageable on current regimen:     Skin:  Darío Score: 18  Intervention    Patient Safety:  Fall Score:  Total Score: 3  Interventions:  High Fall Risk: Yes  @Rollbelt  @dexterity to release roll belt  Yes/No ( must document dexterity  here by stating Yes or No here, otherwise this is a restraint and must follow restraint documentation policy.)    DVT prophylaxis:  DVT prophylaxis Med-  DVT prophylaxis SCD or SHANICE    Wounds: (If Applicable)  Wounds-R arm , dressing D/I  Location    Active Consults:  IP CONSULT TO ORTHOPEDIC SURGERY  IP CONSULT TO INFECTIOUS DISEASES    Length of Stay:  Expected LOS: 4d 0h  Actual LOS: 4  Discharge Plan:

## 2022-08-18 NOTE — PROGRESS NOTES
Spiritual Care Assessment/Progress Note  [facility]      NAME: Pooja Rolle      MRN: [id number]  AGE: 55 y.o.  SEX: female  Roman Catholic Affiliation: No Baptist   Language: English     8/18/2022     Total Time (in minutes): 12     Spiritual Assessment begun in [facility] NEUROSCIENCE TELEMETRY through conversation with:         [x]Patient        [] Family    [] Friend(s)        Reason for Consult: Initial/Spiritual assessment, patient floor     Spiritual beliefs: (Please include comment if needed)     [x] Identifies with a ugo tradition:    Catholic     [x] Supported by a ugo community:   [facility]         [] Claims no spiritual orientation:           [] Seeking spiritual identity:                [] Adheres to an individual form of spirituality:           [] Not able to assess:                           Identified resources for coping:      [x] Prayer                               [] Music                  [] Guided Imagery     [x] Family/friends                 [] Pet visits     [] Devotional reading                         [] Unknown     [] Other:                                                Interventions offered during this visit: (See comments for more details)    Patient Interventions: Affirmation of emotions/emotional suffering, Affirmation of ugo, Catharsis/review of pertinent events in supportive environment, Iconic (affirming the presence of God/Higher Power), Initial/Spiritual assessment, patient floor, Normalization of emotional/spiritual concerns, Prayer (assurance of), Roman Catholic beliefs/image of God discussed           Plan of Care:     [] Support spiritual and/or cultural needs    [] Support AMD and/or advance care planning process      [] Support grieving process   [] Coordinate Rites and/or Rituals    [] Coordination with community clergy   [x] No spiritual needs identified at this time   [] Detailed Plan of Care below (See Comments)  [] Make referral to Music Therapy  [] Make referral to Pet Therapy     [] Make referral to Addiction services  [] Make referral to [facility]  [] Make referral to Spiritual Care Partner  [] No future visits requested        [x] Contact Spiritual Care for further referrals     Comments:   Reviewed chart prior to visit on Neuro for spiritual assessment. No family/friends present. Patient was laying in bed appearing to be in fair spirits. She was welcoming of visit. Provided supportive listening presence as she spoke about current medical challenges. She reports having good support from family and friends as well as spiritual support. She is a member of [address] [place] [address] on [address] in [facility]. She expressed no spiritual needs or concerns but was receptive to assurance of prayer. Advised her of ongoing availability of pastoral support.      Farrah Orellana, [facility], [address] [facility], Staff [address] [place] [address] Avenue    [address] [place] [address] Road Paging Service  [phone]

## 2022-08-19 VITALS
BODY MASS INDEX: 24.75 KG/M2 | HEIGHT: 64 IN | RESPIRATION RATE: 20 BRPM | HEART RATE: 73 BPM | WEIGHT: 145 LBS | SYSTOLIC BLOOD PRESSURE: 128 MMHG | DIASTOLIC BLOOD PRESSURE: 84 MMHG | TEMPERATURE: 98.3 F | OXYGEN SATURATION: 100 %

## 2022-08-19 RX ORDER — ACETAMINOPHEN 325 MG/1
650 TABLET ORAL
Qty: 60 TABLET | Refills: 0 | Status: SHIPPED | OUTPATIENT
Start: 2022-08-19

## 2022-08-19 RX ORDER — LEVOFLOXACIN 750 MG/1
750 TABLET ORAL EVERY 24 HOURS
Qty: 7 TABLET | Refills: 0 | Status: SHIPPED | OUTPATIENT
Start: 2022-08-19

## 2022-08-19 RX ORDER — HYDROCODONE BITARTRATE AND ACETAMINOPHEN 5; 325 MG/1; MG/1
1 TABLET ORAL
Qty: 3 TABLET | Refills: 0 | Status: SHIPPED | OUTPATIENT
Start: 2022-08-19 | End: 2022-08-21

## 2022-08-19 RX ORDER — MUPIROCIN 20 MG/G
OINTMENT TOPICAL DAILY
Qty: 22 G | Refills: 0 | Status: SHIPPED | OUTPATIENT
Start: 2022-08-19

## 2022-08-19 RX ORDER — LINEZOLID 600 MG/1
600 TABLET, FILM COATED ORAL 2 TIMES DAILY
Qty: 14 TABLET | Refills: 0 | Status: SHIPPED | OUTPATIENT
Start: 2022-08-19

## 2022-08-19 NOTE — PROGRESS NOTES
Patients wound cultures grew MRSA, case discussed with ID attending, patient prescribed Zyvox bid and nasal mupirocin

## 2022-08-19 NOTE — PROGRESS NOTES
Bedside shift change report given to Laura Alvarado, RN (oncoming nurse) by [facility], RN (offgoing nurse). Report included the following information SBAR, Kardex, Intake/Output, MAR, and Med Rec Status. 0730: pt. Refused to allow RN to assess RUE wound. Per patient, \"it feels fine and it looks fine to me\". Pt. Educated on why assessments are being done to ensure that the wound is not becoming infected. Pt.still declines. 1346: FYI: Lab reported a preliminary MRSA + culture in patient's wound. 1348: infectious disease MD, [place], perfect serve. FYI: Lab reported a preliminary MRSA + culture in patient's wound. Pt. is being discharged today. Ride is on the way. 1350: pt. Educated on discharge paperwork and education including importance of follow up appointments, medication adherence, and when to notify provider. Pt. Verbalized understanding of discharge education/instructions with all questions/concerns addressed. Pt. And RN signed discharge paperwork. Pt. Given wound care supplies and printed copy of wound care instructions [facility]. Pt.  Arin Tyler to [facility] and attend PCP visit to ensure proper care of wound. 1400: pt. Discharged home at this time. Pt. With discharge paperwork and supplies/belongings in hand.

## 2022-08-19 NOTE — PROGRESS NOTES
ID    MRSA + in Wound culture   Change to Zyvox po for DC   Add  nasal mupirocin . D/w Dr Stephanie Aquino.

## 2022-08-19 NOTE — PROGRESS NOTES
Problem: Falls - Risk of  Goal: *Absence of Falls  Description: Document Riley Mcgarry Fall Risk and appropriate interventions in the flowsheet. Outcome: Resolved/Met  Note: Fall Risk Interventions:  Mobility Interventions: Bed/chair exit alarm, Patient to call before getting OOB, Utilize walker, cane, or other assistive device         Medication Interventions: Bed/chair exit alarm    Elimination Interventions: Bed/chair exit alarm, Call light in reach, Elevated toilet seat, Patient to call for help with toileting needs, Toileting schedule/hourly rounds, Toilet paper/wipes in reach, Stay With Me (per policy)    History of Falls Interventions: Bed/chair exit alarm, Consult care management for discharge planning, Door open when patient unattended, Investigate reason for fall, Room close to nurse's station         Problem: Patient Education: Go to Patient Education Activity  Goal: Patient/Family Education  Outcome: Resolved/Met     Problem: Pressure Injury - Risk of  Goal: *Prevention of pressure injury  Description: Document Darío Scale and appropriate interventions in the flowsheet.   Outcome: Resolved/Met  Note: Pressure Injury Interventions:  Sensory Interventions: Assess changes in LOC    Moisture Interventions: Absorbent underpads    Activity Interventions: Increase time out of bed    Mobility Interventions: HOB 30 degrees or less, Chair cushion    Nutrition Interventions: Document food/fluid/supplement intake, Offer support with meals,snacks and hydration    Friction and Shear Interventions: HOB 30 degrees or less                Problem: Patient Education: Go to Patient Education Activity  Goal: Patient/Family Education  Outcome: Resolved/Met     Problem: Pain  Goal: *Control of Pain  Outcome: Resolved/Met  Goal: *PALLIATIVE CARE:  Alleviation of Pain  Outcome: Resolved/Met     Problem: Patient Education: Go to Patient Education Activity  Goal: Patient/Family Education  Outcome: Resolved/Met     Problem: General Medical Care Plan  Goal: *Vital signs within specified parameters  Outcome: Resolved/Met  Goal: *Labs within defined limits  Outcome: Resolved/Met  Goal: *Absence of infection signs and symptoms  Outcome: Resolved/Met  Goal: *Optimal pain control at patient's stated goal  Outcome: Resolved/Met  Goal: *Skin integrity maintained  Outcome: Resolved/Met  Goal: *Fluid volume balance  Outcome: Resolved/Met  Goal: *Optimize nutritional status  Outcome: Resolved/Met  Goal: *Anxiety reduced or absent  Outcome: Resolved/Met  Goal: *Progressive mobility and function (eg: ADL's)  Outcome: Resolved/Met     Problem: Patient Education: Go to Patient Education Activity  Goal: Patient/Family Education  Outcome: Resolved/Met     Problem: Patient Education: Go to Patient Education Activity  Goal: Patient/Family Education  Outcome: Resolved/Met     Problem: Patient Education: Go to Patient Education Activity  Goal: Patient/Family Education  Outcome: Resolved/Met

## 2022-08-19 NOTE — PROGRESS NOTES
Transition of Care Plan:     RUR:8% low risk   Disposition: home with home health   Follow up appointments: PCP   DME needed: rolling walker   Transportation at Discharge: sister to transport   [address] or means to access home: Pt has access       IM Medicare Letter: N/a   Is patient a [place] and connected with the [place]? N/a           If yes, was [employer] transfer form completed and [facility] notified? N/a  Caregiver Contact: sister Amber Suh: [phone]  Discharge Caregiver contacted prior to discharge? Pt to contact   Care Conference needed?:   no          CM notified of Pt discharging today. Information regarding PCP follow-up has been arranged and added to AVS.    [facility] for [facility]. CM arranged appointment with [facility] for methadone treatment      CM notified nursing      No further discharge needs indicated at this time. Pt is cleared from CM standpoint.     Aaron Oliva, ESTRADA Baxter

## 2022-08-19 NOTE — PROGRESS NOTES
End of Shift Note    Bedside shift change report given to Cody Steel RN (oncoming nurse) by India Mcmanus RN (offgoing nurse). Report included the following information SBAR, Kardex, and MAR    Shift worked:  7P-7:30am     Shift summary and any significant changes:     Pt. Continues to refuse medications. Educated throughout shift. Pt. Would not allow RN to assess RUE wound. Concerns for physician to address: Blood cultures pending     Zone phone for oncoming shift:         Activity:  Activity Level: Bed Rest  Number times ambulated in hallways past shift: 0  Number of times OOB to chair past shift: 0    Cardiac:   Cardiac Monitoring: No      Cardiac Rhythm: Sinus Rhythm    Access:  Current line(s): PIV     Genitourinary:   Urinary status: voiding    Respiratory:   O2 Device: None (Room air)  Chronic home O2 use?: N/A  Incentive spirometer at bedside: N/A       GI:     Current diet:  ADULT DIET Regular  Passing flatus: YES  Tolerating current diet: YES       Pain Management:   Patient states pain is manageable on current regimen: N/A    Skin:  Darío Score: 18  Interventions: PT/OT consult    Patient Safety:  Fall Score:  Total Score: 3  Interventions: bed/chair alarm, gripper socks, pt to call before getting OOB, and stay with me (per policy)  High Fall Risk: Yes    Length of Stay:  Expected LOS: 4d 0h  Actual LOS: [address], RN

## 2022-08-19 NOTE — DISCHARGE SUMMARY
Hospitalist Discharge Summary     Patient ID:  Oriana Oviedo  [id number]  55 y.o.  1975 8/14/2022    PCP on record: None    Admit date: 8/14/2022  Discharge date and time: 8/19/2022    DISCHARGE DIAGNOSIS:    Right knee swelling/pain/hematoma/right upper extremity cellulitis/hypokalemia/history of heroin use    CONSULTATIONS:  IP CONSULT TO ORTHOPEDIC SURGERY  IP CONSULT TO INFECTIOUS DISEASES    Excerpted HPI from H&P of Norman Waters MD:  60-year-old female with a past medical history significant for a prior right knee surgery who presents to the emergency department complaining of right knee swelling as well as a chronic right arm wound. She states that around 1 month ago, she fell twisting her right knee and scraping her right forearm. She reports that since this time, she has been having worsening of her right forearm wound as well as worsening swelling and redness of her right knee. It has gotten particularly bad in the last couple of days prompting an emergency room visit. She states that she has been having difficulty ambulating secondary to her right knee pain and swelling. Otherwise, she denies any fever/chills, headache, dizziness, chest pain, palpitations, cough, shortness of breath, abdominal pain or nausea/vomiting. She initially presented to the [facility] emergency department on 8/13. At that time, she was noted to be tachycardic with otherwise stable vital signs within normal limits. An initial CBC was significant for a white blood cell count of 10.6 and hemoglobin of 8.5. An initial CMP was significant for a potassium of 2.9. A CT scan of her right knee was concerning for inflammatory versus septic arthritis which is acute or chronic.   She was given IV daptomycin and recommended that her right knee be drained however she requested that this be done by an orthopedic surgeon who was not present at [facility].  She was transferred to [facility] at this time. She will be admitted to the hospital service and orthopedic surgery will be consulted.    ______________________________________________________________________  DISCHARGE SUMMARY/HOSPITAL COURSE:  for full details see H&P, daily progress notes, labs, consult notes. Patient was subsequently admitted to [facility] for further evaluation as well as management, patient was evaluated by orthopedic surgery, patient's right knee swelling as well as pain thought to be secondary to hematoma, not septic arthritis, patient was noted to have significant right upper extremity cellulitis, cultures were sent, patient was evaluated by wound care as well as infectious disease, of note patient was refusing IV antibiotics, was transitioned to oral antibiotics, initially patient was recommended for skilled nursing facility, after clearance by case management and arrangement of wound care and home health patient was deemed stable for discharge home with home health on oral antibiotics with home wound care with outpatient follow-up.          _______________________________________________________________________  Patient seen and examined by me on discharge day. Pertinent Findings:  Gen:    Not in distress  Chest: Clear lungs  CVS:   Regular rhythm, s1/s2 no m/r/g  No edema  Abd:  Soft, not distended, not tender  Neuro:  Alert, Oriented x 4  _______________________________________________________________________  DISCHARGE MEDICATIONS:   Discharge Medication List as of 8/19/2022  1:03 PM        START taking these medications    Details   acetaminophen (TYLENOL) 325 mg tablet Take 2 Tablets by mouth every six (6) hours as needed for Pain or Fever., Normal, Disp-60 Tablet, R-0      levoFLOXacin (LEVAQUIN) 750 mg tablet Take 1 Tablet by mouth every twenty-four (24) hours. , Normal, Disp-7 Tablet, R-0           CONTINUE these medications which have CHANGED    Details HYDROcodone-acetaminophen (NORCO) 5-325 mg per tablet Take 1 Tablet by mouth every eight (8) hours as needed for Pain for up to 8 doses. , Normal, Disp-3 Tablet, R-0           STOP taking these medications       clindamycin (CLEOCIN) 300 mg capsule Comments:   Reason for Stopping:         promethazine (PHENERGAN) 25 mg suppository Comments:   Reason for Stopping:                 Patient Follow Up Instructions: Activity: PT/OT per Home Health  Diet: Regular Diet  Wound Care: cleanse with NS moist 4x4, wipe clean. Apply 1/2 tube of Therahoney onto wound and then cover wound with 1/2 sheet of Therahoney sheet, ABD pad and secure with keith and ace bandage/coban    Follow-up with PCP/Infectious Disease/Orthopedic Surgery in 2 weeks. Follow-up tests/labs As per above physicians  Follow-up Information       Follow up With Specialties Details Why Contact Info    [facility]  Go on 8/22/2022 at 12pm. Please bring a photo ID and your hospital discharge papers to your appointment.  Emily Maher 70.  [facility], [address] [place] [address]  ph: [phone]    Marie Florez MD Family Medicine, Sports Medicine Physician Go on 10/6/2022 at 2:30pm for your NEW PATIENT hospital follow up. [address] [phone]      None    None (868) Patient stated that they have no PCP      Everton Castaneda MD Infectious Disease Physician, Internal Medicine Physician Follow up in 1 month(s)  [address] [facility]  [phone]      Karlee Pollock DO Orthopedic Surgery Follow up in 1 month(s)  [address] [facility] [address]  [phone]            ________________________________________________________________    Risk of deterioration: Low    Condition at Discharge:  Stable  __________________________________________________________________    Disposition  Home with family and home health services    ____________________________________________________________________    Code Status: Full Code  ___________________________________________________________________      Total time in minutes spent coordinating this discharge (includes going over instructions, follow-up, prescriptions, and preparing report for sign off to her PCP) :  45 minutes    Signed:   Alison Her MD

## 2022-08-19 NOTE — ED NOTES
Progress Note:  Please see FULL H and P from [facility]. Pt transferred to ED due to holds. Pt had been accepted by hospitalist for admission. Pt arrives stable. Pt has red, hot, swollen knee that was likely septic joint had refused ED provider at [facility] to perform arthrocentesis. Consult:  Case discussed with Ortho who will come and evaluate pt. Consult:  Case discussed with hospitalist, pt will be evaluated and admitted.      [facility], DO

## 2022-08-19 NOTE — PROGRESS NOTES
Problem: Falls - Risk of  Goal: *Absence of Falls  Description: Document Jamarcus Cease Fall Risk and appropriate interventions in the flowsheet. Outcome: Progressing Towards Goal  Note: Fall Risk Interventions:  Mobility Interventions: Bed/chair exit alarm         Medication Interventions: Bed/chair exit alarm    Elimination Interventions: Bed/chair exit alarm    History of Falls Interventions: Bed/chair exit alarm         Problem: Patient Education: Go to Patient Education Activity  Goal: Patient/Family Education  Outcome: Progressing Towards Goal     Problem: Pressure Injury - Risk of  Goal: *Prevention of pressure injury  Description: Document Darío Scale and appropriate interventions in the flowsheet.   Outcome: Progressing Towards Goal  Note: Pressure Injury Interventions:  Sensory Interventions: Assess changes in LOC    Moisture Interventions: Absorbent underpads, Apply protective barrier, creams and emollients    Activity Interventions: PT/OT evaluation    Mobility Interventions: HOB 30 degrees or less, PT/OT evaluation    Nutrition Interventions: Document food/fluid/supplement intake, Offer support with meals,snacks and hydration    Friction and Shear Interventions: Apply protective barrier, creams and emollients                Problem: Patient Education: Go to Patient Education Activity  Goal: Patient/Family Education  Outcome: Progressing Towards Goal     Problem: Pain  Goal: *Control of Pain  Outcome: Progressing Towards Goal     Problem: Patient Education: Go to Patient Education Activity  Goal: Patient/Family Education  Outcome: Progressing Towards Goal     Problem: General Medical Care Plan  Goal: *Vital signs within specified parameters  Outcome: Progressing Towards Goal  Goal: *Labs within defined limits  Outcome: Progressing Towards Goal  Goal: *Absence of infection signs and symptoms  Outcome: Progressing Towards Goal  Goal: *Optimal pain control at patient's stated goal  Outcome: Progressing Towards Goal  Goal: *Skin integrity maintained  Outcome: Progressing Towards Goal  Goal: *Fluid volume balance  Outcome: Progressing Towards Goal  Goal: *Optimize nutritional status  Outcome: Progressing Towards Goal  Goal: *Anxiety reduced or absent  Outcome: Progressing Towards Goal  Goal: *Progressive mobility and function (eg: ADL's)  Outcome: Progressing Towards Goal     Problem: Patient Education: Go to Patient Education Activity  Goal: Patient/Family Education  Outcome: Progressing Towards Goal     Problem: Patient Education: Go to Patient Education Activity  Goal: Patient/Family Education  Outcome: Progressing Towards Goal     Problem: Patient Education: Go to Patient Education Activity  Goal: Patient/Family Education  Outcome: Progressing Towards Goal     Problem: Patient Education: Go to Patient Education Activity  Goal: Patient/Family Education  Outcome: Progressing Towards Goal     Problem: Pain  Goal: *Control of Pain  Outcome: Progressing Towards Goal     Problem: General Medical Care Plan  Goal: *Absence of infection signs and symptoms  Outcome: Progressing Towards Goal

## 2022-08-20 LAB
BACTERIA SPEC CULT: ABNORMAL
GRAM STN SPEC: ABNORMAL
GRAM STN SPEC: ABNORMAL
SERVICE CMNT-IMP: ABNORMAL